# Patient Record
Sex: FEMALE | Race: WHITE | NOT HISPANIC OR LATINO | Employment: FULL TIME | URBAN - METROPOLITAN AREA
[De-identification: names, ages, dates, MRNs, and addresses within clinical notes are randomized per-mention and may not be internally consistent; named-entity substitution may affect disease eponyms.]

---

## 2017-01-17 ENCOUNTER — OFFICE VISIT (OUTPATIENT)
Dept: OBSTETRICS AND GYNECOLOGY | Facility: CLINIC | Age: 37
End: 2017-01-17

## 2017-01-17 VITALS
BODY MASS INDEX: 19.63 KG/M2 | WEIGHT: 115 LBS | SYSTOLIC BLOOD PRESSURE: 116 MMHG | DIASTOLIC BLOOD PRESSURE: 80 MMHG | HEIGHT: 64 IN

## 2017-01-17 DIAGNOSIS — Z00.00 ANNUAL PHYSICAL EXAM: Primary | ICD-10-CM

## 2017-01-17 PROCEDURE — 99395 PREV VISIT EST AGE 18-39: CPT | Performed by: OBSTETRICS & GYNECOLOGY

## 2017-01-17 NOTE — PROGRESS NOTES
"Subjective   Chief Complaint   Patient presents with   • Gynecologic Exam     missed last menses, neg upt     Francesca Castillo is a 36 y.o. year old  presenting to be seen for her annual exam.    Current birth control method: none.    Patient's last menstrual period was 2016.    She is sexually active.   Condoms are not typically used.      Past 6 month menstrual history:    Cycle Frequency: regular, predictable and consistent every 28 - 32 days   Menstrual cycle character: flow is normal   Cycle Duration: 5 - 7   Number of heavy days of flows: 2   Intermenstrual bleeding present: {no   Post-coital bleeding present: no     She exercises regularly: yes.  Calcium intake: no.  She performs self breast exam:yes.  She has concerns about domestic violence: no.    The following portions of the patient's history were reviewed and updated as appropriate:problem list, current medications, allergies, past family history, past medical history, past social history and past surgical history.    Review of Systems normal bladder and bowels     Objective   Visit Vitals   • /80   • Ht 64\" (162.6 cm)   • Wt 115 lb (52.2 kg)   • LMP 2016  Comment: 28 cycle days   • BMI 19.74 kg/m2       General:  well developed; well nourished  no acute distress   Skin:  No suspicious lesions seen   Thyroid: normal to inspection and palpation   Breasts:  Examined in supine position  Symmetric without masses or skin dimpling  Nipples normal without inversion, lesions or discharge  Bilateral implants are noted without obvious palpable abnormalities   Abdomen: soft, non-tender; no masses  no umbilical or inginual hernias are present  no hepato-splenomegaly   Pelvis: Clinical staff was present for exam  External genitalia:  normal appearance of the external genitalia including Bartholin's and Bend's glands.  :  urethral meatus normal; urethral hypermobility is absent.  Vaginal:  normal pink mucosa without prolapse or " lesions.  Cervix:  normal appearance. pap  Uterus:  normal size, shape and consistency. anteverted;  Adnexa:  normal bimanual exam of the adnexa.  Rectal:  digital rectal exam not performed; anus visually normal appearing.     Lab Review   No data reviewed    Imaging  No data reviewed       Assessment   1. A normal GYN exam she needs prenatal vitamin since she's not using anything for birth control.  They're currently not trying actively get pregnant but also not trying to prevent pregnancy.  She had a normal pregnancy test ×2 last week as she is late for her period.  Uterus feels very small on exam  2. I discussed that at her body habitus she needs 1000 mg of calcium daily.     Plan   1. Increase calcium and prenatal vitamins  2. If no period by February 1 we can check a beta hCG and/or TSH level    Medications Rx this encounter:  No orders of the defined types were placed in this encounter.         This note was electronically signed.    Micah Villagomez MD  1/17/2017

## 2017-01-17 NOTE — MR AVS SNAPSHOT
Francesca Castillo   2017 10:10 AM   Office Visit    Dept Phone:  [phone]   Encounter #:  [id number]    Provider:  Micah Villagomez MD   Department:  [facility]                Your Full Care Plan              Today's Medication Changes          These changes are accurate as of: 17 10:52 AM.  If you have any questions, ask your nurse or doctor.               Stop taking medication(s)listed here:     LOESTRIN 24 FE PO   Stopped by:  Micah Villagomez MD                      Your Updated Medication List          This list is accurate as of: 17 10:52 AM.  Always use your most recent med list.                BENADRYL ALLERGY PO               You Were Diagnosed With        Codes Comments    Annual physical exam    -  Primary ICD-10-CM: Z00.00  ICD-9-CM: V70.0       Instructions     None    Patient Instructions History      Upcoming Appointments     Visit Type Date Time Department    ANNUAL 2017 10:10 AM [facility] K[facility]      Mang?rKarthart Signup     [facility] [employer] allows you to send messages to your doctor, view your test results, renew your prescriptions, schedule appointments, and more. To sign up, go to [employer] and click on the Sign Up Now link in the New User? box. Enter your [employer] Activation Code exactly as it appears below along with the last four digits of your Social Security Number and your Date of Birth () to complete the sign-up process. If you do not sign up before the expiration date, you must request a new code.    [employer] Activation Code: [email or web address]  Expires: 2017 10:52 AM    If you have questions, you can email [email or web address] or call [phone] to talk to our [employer] staff. Remember, [employer] is NOT to be used for urgent needs. For medical emergencies, dial 911.               Other Info from Your Visit           Your Appointments     2018  9:00 AM EST   Annual with  "Micah Villagomez MD   [facility] [place] (--)    [address]  [place] [address] [phone]              Allergies     Penicillins        Reason for Visit     Gynecologic Exam missed last menses, neg upt      Vital Signs     Blood Pressure Height Weight Last Menstrual Period Body Mass Index Smoking Status    116/80 64\" (162.6 cm) 115 lb (52.2 kg) 12/05/2016 19.74 kg/m2 Never Smoker      Problems and Diagnoses Noted     Annual physical exam    -  Primary        "

## 2017-10-25 ENCOUNTER — LAB REQUISITION (OUTPATIENT)
Dept: LAB | Facility: HOSPITAL | Age: 37
End: 2017-10-25

## 2017-10-25 ENCOUNTER — TELEPHONE (OUTPATIENT)
Dept: OBSTETRICS AND GYNECOLOGY | Facility: CLINIC | Age: 37
End: 2017-10-25

## 2017-10-25 ENCOUNTER — APPOINTMENT (OUTPATIENT)
Dept: LAB | Facility: HOSPITAL | Age: 37
End: 2017-10-25

## 2017-10-25 DIAGNOSIS — N91.2 AMENORRHEA: Primary | ICD-10-CM

## 2017-10-25 DIAGNOSIS — Z00.00 ROUTINE GENERAL MEDICAL EXAMINATION AT A HEALTH CARE FACILITY: ICD-10-CM

## 2017-10-25 LAB — HCG INTACT+B SERPL-ACNC: 560 MIU/ML

## 2017-10-25 PROCEDURE — 84702 CHORIONIC GONADOTROPIN TEST: CPT | Performed by: OBSTETRICS & GYNECOLOGY

## 2017-11-02 LAB — HCG INTACT+B SERPL-ACNC: 560 MIU/ML

## 2017-11-16 ENCOUNTER — INITIAL PRENATAL (OUTPATIENT)
Dept: OBSTETRICS AND GYNECOLOGY | Facility: CLINIC | Age: 37
End: 2017-11-16

## 2017-11-16 VITALS — DIASTOLIC BLOOD PRESSURE: 60 MMHG | SYSTOLIC BLOOD PRESSURE: 108 MMHG | WEIGHT: 118 LBS | BODY MASS INDEX: 20.25 KG/M2

## 2017-11-16 DIAGNOSIS — Z3A.01 LESS THAN 8 WEEKS GESTATION OF PREGNANCY: Primary | ICD-10-CM

## 2017-11-16 PROBLEM — Z98.891 STATUS POST REPEAT LOW TRANSVERSE CESAREAN SECTION: Status: ACTIVE | Noted: 2017-11-16

## 2017-11-16 PROCEDURE — 0501F PRENATAL FLOW SHEET: CPT | Performed by: OBSTETRICS & GYNECOLOGY

## 2017-11-19 PROBLEM — Z98.82 H/O BREAST AUGMENTATION: Status: ACTIVE | Noted: 2017-11-19

## 2017-11-19 PROBLEM — O34.219 VAGINAL BIRTH AFTER CESAREAN: Status: ACTIVE | Noted: 2017-11-19

## 2017-11-19 NOTE — PROGRESS NOTES
Subjective   Chief Complaint   Patient presents with   • Initial Prenatal Visit       Francesca Castillo is a 37 y.o. year old .  Patient's last menstrual period was 2017 (exact date).  She presents to be seen to initiate prenatal care. Patient had initial   for breech presentation at 38 weeks.  Also low fluid.   she had twins delivered at 33 weeks.  Twin A Simon was vaginal Huynh converted from breech to transverse and was delivered by .  Otherwise she is very healthy some nausea this pregnancy.  Discussed Diclegis small frequent meals and acids etc.    Smoking status: Never Smoker                                                              Smokeless status: Never Used                          The following portions of the patient's history were reviewed and updated as appropriate:vital signs, allergies, current medications, past medical history, past social history, past surgical history and problem list.    Objective   Lab Review   No data reviewed    Imaging   Pelvic ultrasound report    Assessment/Plan   ASSESSMENT  1. 37 y.o. year old  at 7 weeks 3 days dates and ultrasound  2. Supervision of high risk pregnancy  3. Previous C/S with planned repeat C/S    PLAN  1. The problem list for pregnancy was initiated today  2. Tests ordered today:  3. Genetic testing reviewed: NIPT ([place])  4. Information reviewed: exercise in pregnancy, nutrition in pregnancy, weight gain in pregnancy, work and travel restrictions during pregnancy, list of OTC medications acceptable in pregnancy and call coverage groups    Follow up: 3 week(s)       This note was electronically signed.    Micah Villagomez MD  2017

## 2017-12-08 ENCOUNTER — ROUTINE PRENATAL (OUTPATIENT)
Dept: OBSTETRICS AND GYNECOLOGY | Facility: CLINIC | Age: 37
End: 2017-12-08

## 2017-12-08 ENCOUNTER — LAB REQUISITION (OUTPATIENT)
Dept: LAB | Facility: HOSPITAL | Age: 37
End: 2017-12-08

## 2017-12-08 VITALS — DIASTOLIC BLOOD PRESSURE: 60 MMHG | WEIGHT: 118 LBS | SYSTOLIC BLOOD PRESSURE: 106 MMHG | BODY MASS INDEX: 20.25 KG/M2

## 2017-12-08 DIAGNOSIS — O09.521 SUPERVISION OF ELDERLY MULTIGRAVIDA IN FIRST TRIMESTER: ICD-10-CM

## 2017-12-08 DIAGNOSIS — Z00.00 ROUTINE GENERAL MEDICAL EXAMINATION AT A HEALTH CARE FACILITY: ICD-10-CM

## 2017-12-08 DIAGNOSIS — O09.521 AMA (ADVANCED MATERNAL AGE) MULTIGRAVIDA 35+, FIRST TRIMESTER: ICD-10-CM

## 2017-12-08 DIAGNOSIS — Z3A.10 10 WEEKS GESTATION OF PREGNANCY: Primary | ICD-10-CM

## 2017-12-08 DIAGNOSIS — Z3A.10 10 WEEKS GESTATION OF PREGNANCY: ICD-10-CM

## 2017-12-08 LAB — EXTERNAL NIPT: NORMAL

## 2017-12-08 PROCEDURE — 36415 COLL VENOUS BLD VENIPUNCTURE: CPT | Performed by: OBSTETRICS & GYNECOLOGY

## 2017-12-08 PROCEDURE — 36415 COLL VENOUS BLD VENIPUNCTURE: CPT

## 2017-12-08 PROCEDURE — 0502F SUBSEQUENT PRENATAL CARE: CPT | Performed by: OBSTETRICS & GYNECOLOGY

## 2017-12-08 RX ORDER — CALCIUM CITRATE, IRON PENTACARBONYL, CHOLECALCIFEROL, .ALPHA.-TOCOPHEROL, DL-, PYRIDOXINE HYDROCHLORIDE, FOLIC ACID, DOCUSATE SODIUM, AND DOCONEXENT 104; 27; 400; 30; 25; 1; 50; 260 MG/1; MG/1; [IU]/1; [IU]/1; MG/1; MG/1; MG/1; MG/1
1 CAPSULE, GELATIN COATED ORAL DAILY
Qty: 30 CAPSULE | Refills: 11 | Status: SHIPPED | OUTPATIENT
Start: 2017-12-08 | End: 2018-02-05 | Stop reason: ALTCHOICE

## 2017-12-08 NOTE — PROGRESS NOTES
Chief Complaint   Patient presents with   • Routine Prenatal Visit       HPI: Francesca is a  currently at 10w4d who today reports the following:  Nausea - improved as compared to the prior visit; Vaginal bleeding -  No; Heartburn - No.    ROS:  GI: Constipation - improved as compared to the prior visit; Diarrhea - No    Neuro: Headache - No; Visual change - No      EXAM:  Vitals: See prenatal flowsheet   Abdomen: See prenatal flowsheet   Urine glucose/protein: See prenatal flowsheet   Pelvic: See prenatal flowsheet     Prenatal Labs  No results found for: HGB, RUBELLAABIGG, HEPBSAG, LABRPR, ABORH, ABSCRN, LABANTI, ABID, XRBZEAU90, DII2ALW8, HEPCVIRUSABY, ETPXLBZ7ST, GCT, GGTFASTING, ZGF3HESN, OEE1RVDY, TFU2WXSK, STREPGPB, URINECX, CHLAMNAA, NGONORRHON    MDM:  Impression: 1. Supervision of high risk pregnancy  2. AMA   Tests done today: 1. labs and NIPT   Topics discussed: 1. Continue with PNV's  2. Prenatal labs reviewed  3. none - she had no major complaints,questions or concerns   Tests scheduled today for her next visit:   none   Next visit: See prenatal flowsheet

## 2017-12-09 LAB
ABO GROUP BLD: (no result)
APPEARANCE UR: CLEAR
BACTERIA #/AREA URNS HPF: NORMAL /[HPF]
BASOPHILS # BLD AUTO: 0 X10E3/UL (ref 0–0.2)
BASOPHILS NFR BLD AUTO: 0 %
BILIRUB UR QL STRIP: NEGATIVE
BLD GP AB SCN SERPL QL: NEGATIVE
COLOR UR: YELLOW
EOSINOPHIL # BLD AUTO: 0 X10E3/UL (ref 0–0.4)
EOSINOPHIL NFR BLD AUTO: 1 %
EPI CELLS #/AREA URNS HPF: NORMAL /HPF
ERYTHROCYTE [DISTWIDTH] IN BLOOD BY AUTOMATED COUNT: 13.5 % (ref 12.3–15.4)
GLUCOSE UR QL: NEGATIVE
HBV SURFACE AG SERPL QL IA: NEGATIVE
HCT VFR BLD AUTO: 42.3 % (ref 34–46.6)
HGB BLD-MCNC: 14.3 G/DL (ref 11.1–15.9)
HGB UR QL STRIP: NEGATIVE
HIV 1+2 AB+HIV1 P24 AG SERPL QL IA: NON REACTIVE
IMM GRANULOCYTES # BLD: 0 X10E3/UL (ref 0–0.1)
IMM GRANULOCYTES NFR BLD: 0 %
KETONES UR QL STRIP: NEGATIVE
LEUKOCYTE ESTERASE UR QL STRIP: NEGATIVE
LYMPHOCYTES # BLD AUTO: 1.3 X10E3/UL (ref 0.7–3.1)
LYMPHOCYTES NFR BLD AUTO: 16 %
MCH RBC QN AUTO: 30 PG (ref 26.6–33)
MCHC RBC AUTO-ENTMCNC: 33.8 G/DL (ref 31.5–35.7)
MCV RBC AUTO: 89 FL (ref 79–97)
MICRO URNS: NORMAL
MICRO URNS: NORMAL
MONOCYTES # BLD AUTO: 0.8 X10E3/UL (ref 0.1–0.9)
MONOCYTES NFR BLD AUTO: 9 %
MUCOUS THREADS URNS QL MICRO: PRESENT
NEUTROPHILS # BLD AUTO: 6.1 X10E3/UL (ref 1.4–7)
NEUTROPHILS NFR BLD AUTO: 74 %
NITRITE UR QL STRIP: NEGATIVE
PH UR STRIP: 7.5 [PH] (ref 5–7.5)
PLATELET # BLD AUTO: 222 X10E3/UL (ref 150–379)
PROT UR QL STRIP: NEGATIVE
RBC # BLD AUTO: 4.76 X10E6/UL (ref 3.77–5.28)
RBC #/AREA URNS HPF: NORMAL /HPF
RH BLD: POSITIVE
RPR SER QL: NON REACTIVE
RUBV IGG SERPL IA-ACNC: 2.64 INDEX
SP GR UR: 1.01 (ref 1–1.03)
URINALYSIS REFLEX: NORMAL
UROBILINOGEN UR STRIP-MCNC: 0.2 MG/DL (ref 0.2–1)
WBC # BLD AUTO: 8.2 X10E3/UL (ref 3.4–10.8)
WBC #/AREA URNS HPF: NORMAL /HPF

## 2018-01-10 ENCOUNTER — LAB (OUTPATIENT)
Dept: LAB | Facility: HOSPITAL | Age: 38
End: 2018-01-10

## 2018-01-10 ENCOUNTER — ROUTINE PRENATAL (OUTPATIENT)
Dept: OBSTETRICS AND GYNECOLOGY | Facility: CLINIC | Age: 38
End: 2018-01-10

## 2018-01-10 VITALS — WEIGHT: 120 LBS | SYSTOLIC BLOOD PRESSURE: 106 MMHG | BODY MASS INDEX: 20.6 KG/M2 | DIASTOLIC BLOOD PRESSURE: 60 MMHG

## 2018-01-10 DIAGNOSIS — Z3A.15 15 WEEKS GESTATION OF PREGNANCY: Primary | ICD-10-CM

## 2018-01-10 DIAGNOSIS — O09.521 AMA (ADVANCED MATERNAL AGE) MULTIGRAVIDA 35+, FIRST TRIMESTER: Primary | ICD-10-CM

## 2018-01-10 DIAGNOSIS — Z3A.15 15 WEEKS GESTATION OF PREGNANCY: ICD-10-CM

## 2018-01-10 LAB — AFP INTERP SERPL-IMP: NEGATIVE

## 2018-01-10 PROCEDURE — 36415 COLL VENOUS BLD VENIPUNCTURE: CPT

## 2018-01-10 PROCEDURE — 0502F SUBSEQUENT PRENATAL CARE: CPT | Performed by: OBSTETRICS & GYNECOLOGY

## 2018-01-10 NOTE — PROGRESS NOTES
Chief Complaint   Patient presents with   • Routine Prenatal Visit     occas cramping       HPI: Francesca is a  currently at 15w2d who today reports the following:  Contractions - No; Leaking - clear discharge no odor or itch; Vaginal bleeding -  No; Heartburn - No.    ROS:  GI: Nausea - No ; Constipation - improved as compared to the prior visit; Diarrhea - No    Neuro: Headache - 2 weeks / prone to headaches anyway 1 tylenol prn ; Visual change - No      EXAM  Vitals: See prenatal flowsheet   Abdomen: See prenatal flowsheet   Urine glucose/protein: See prenatal flowsheet   Pelvic: See prenatal flowsheet     Lab Results   Component Value Date    ABO O 2017    RH Positive 2017    ABSCRN Negative 2017       MDM:  Impression: 1. Supervision of high risk pregnancy  2. Previous C/S with route of delivery to be determined  3. AMA   4.  of 3# first twin at 33 weeks   Tests done today: 1. AFP   Topics discussed: 1. Continue with PNV's  2. Prenatal labs reviewed  3. NIPT bill or EOB $3000   4. Travel in May at ~34 weeks [place] [address] also travel to [facility] mid February for 11th birthday party  5. Salpingectomy info-  has vasectomy consult also    Tests scheduled today for her next visit:   U/S for anatomic screening   Next visit: See prenatal flowsheet

## 2018-01-12 LAB — REF LAB TEST METHOD: NORMAL

## 2018-02-05 ENCOUNTER — ROUTINE PRENATAL (OUTPATIENT)
Dept: OBSTETRICS AND GYNECOLOGY | Facility: CLINIC | Age: 38
End: 2018-02-05

## 2018-02-05 ENCOUNTER — OFFICE VISIT (OUTPATIENT)
Dept: OBSTETRICS AND GYNECOLOGY | Facility: HOSPITAL | Age: 38
End: 2018-02-05

## 2018-02-05 ENCOUNTER — HOSPITAL ENCOUNTER (OUTPATIENT)
Dept: WOMENS IMAGING | Facility: HOSPITAL | Age: 38
Discharge: HOME | End: 2018-02-05
Attending: OBSTETRICS & GYNECOLOGY | Admitting: OBSTETRICS & GYNECOLOGY

## 2018-02-05 VITALS — WEIGHT: 121 LBS | SYSTOLIC BLOOD PRESSURE: 110 MMHG | BODY MASS INDEX: 21.27 KG/M2 | DIASTOLIC BLOOD PRESSURE: 60 MMHG

## 2018-02-05 VITALS
WEIGHT: 118 LBS | DIASTOLIC BLOOD PRESSURE: 82 MMHG | SYSTOLIC BLOOD PRESSURE: 109 MMHG | HEIGHT: 63 IN | BODY MASS INDEX: 20.91 KG/M2

## 2018-02-05 DIAGNOSIS — Z3A.19 19 WEEKS GESTATION OF PREGNANCY: Primary | ICD-10-CM

## 2018-02-05 DIAGNOSIS — O09.521 AMA (ADVANCED MATERNAL AGE) MULTIGRAVIDA 35+, FIRST TRIMESTER: ICD-10-CM

## 2018-02-05 DIAGNOSIS — O09.521 AMA (ADVANCED MATERNAL AGE) MULTIGRAVIDA 35+, FIRST TRIMESTER: Primary | ICD-10-CM

## 2018-02-05 DIAGNOSIS — Z98.891 PREVIOUS CESAREAN SECTION: ICD-10-CM

## 2018-02-05 PROBLEM — Z20.828 EXPOSURE TO INFLUENZA: Status: ACTIVE | Noted: 2018-02-05

## 2018-02-05 PROCEDURE — 76811 OB US DETAILED SNGL FETUS: CPT | Performed by: OBSTETRICS & GYNECOLOGY

## 2018-02-05 PROCEDURE — 76811 OB US DETAILED SNGL FETUS: CPT

## 2018-02-05 PROCEDURE — 0502F SUBSEQUENT PRENATAL CARE: CPT | Performed by: OBSTETRICS & GYNECOLOGY

## 2018-02-05 RX ORDER — PRENATAL VIT/IRON FUM/FOLIC AC 27MG-0.8MG
TABLET ORAL
Refills: 11 | COMMUNITY
Start: 2018-01-18 | End: 2018-03-19

## 2018-02-05 RX ORDER — OSELTAMIVIR PHOSPHATE 75 MG/1
75 CAPSULE ORAL DAILY
Qty: 10 CAPSULE | Refills: 0 | Status: SHIPPED | OUTPATIENT
Start: 2018-02-05 | End: 2018-03-19

## 2018-02-05 NOTE — PROGRESS NOTES
Reports had [facility] NIPT and [facility]SAFP, both with normal results. Denies problems. To see Dr. Villagomez today.

## 2018-02-05 NOTE — PROGRESS NOTES
Chief Complaint   Patient presents with   • Routine Prenatal Visit     ? exposure to flu? /  trouble sleeping       HPI: Francesca is a  currently at 19w0d who today reports the following: not sleeping well - sick child doesn't help no OTC except Benadryl at night add Melatonin  Contractions - YES - but less than 4/hour AND no associated change in vaginal discharge; Leaking - No; Vaginal bleeding -  No; Heartburn - No.    ROS:  GI: Nausea - improved as compared to the prior visit ; Constipation - YES; Diarrhea - No    Neuro: Headache - No ; Visual change - No      EXAM:  Vitals: See prenatal flowsheet   Abdomen: See prenatal flowsheet   Urine glucose/protein: See prenatal flowsheet   Pelvic: See prenatal flowsheet     Lab Results   Component Value Date    ABO O 2017    RH Positive 2017    ABSCRN Negative 2017       MDM:  Impression: 1. Supervision of low risk pregnancy  2. exposure to flu ? child is sick - Rx tamiflu   Tests done today: 1. none   Topics discussed: 1. Continue with PNV's  2. Prenatal labs reviewed  3. see above   Tests scheduled today for her next visit:   GCT  HgB   Next visit: See prenatal flowsheet

## 2018-03-07 ENCOUNTER — LAB REQUISITION (OUTPATIENT)
Dept: LAB | Facility: HOSPITAL | Age: 38
End: 2018-03-07

## 2018-03-07 ENCOUNTER — ROUTINE PRENATAL (OUTPATIENT)
Dept: OBSTETRICS AND GYNECOLOGY | Facility: CLINIC | Age: 38
End: 2018-03-07

## 2018-03-07 VITALS — DIASTOLIC BLOOD PRESSURE: 70 MMHG | SYSTOLIC BLOOD PRESSURE: 114 MMHG | BODY MASS INDEX: 22.5 KG/M2 | WEIGHT: 128 LBS

## 2018-03-07 DIAGNOSIS — O09.521 AMA (ADVANCED MATERNAL AGE) MULTIGRAVIDA 35+, FIRST TRIMESTER: ICD-10-CM

## 2018-03-07 DIAGNOSIS — Z3A.23 23 WEEKS GESTATION OF PREGNANCY: Primary | ICD-10-CM

## 2018-03-07 DIAGNOSIS — Z00.00 ROUTINE GENERAL MEDICAL EXAMINATION AT A HEALTH CARE FACILITY: ICD-10-CM

## 2018-03-07 LAB — GLUCOSE BLDC GLUCOMTR-MCNC: 139 MG/DL

## 2018-03-07 PROCEDURE — 0502F SUBSEQUENT PRENATAL CARE: CPT | Performed by: OBSTETRICS & GYNECOLOGY

## 2018-03-07 PROCEDURE — 82962 GLUCOSE BLOOD TEST: CPT | Performed by: OBSTETRICS & GYNECOLOGY

## 2018-03-07 PROCEDURE — 36415 COLL VENOUS BLD VENIPUNCTURE: CPT | Performed by: OBSTETRICS & GYNECOLOGY

## 2018-03-07 NOTE — PROGRESS NOTES
Chief Complaint   Patient presents with   • Routine Prenatal Visit       HPI: Francesca is a  currently at 23w2d who today reports the following: frequency   Contractions - YES - but less than 4/hour AND no associated change in vaginal discharge; Leaking - No; Vaginal bleeding -  No; Heartburn - YES.tums not helping try Zantac etc avoid lying down for 2 hours after eating    ROS:  GI: Nausea - No ; Constipation - No; Diarrhea - improved as compared to the prior visit    Neuro: Headache - YES  Sinus related probably; Flonase etc okay; Visual change - No      EXAM:  Vitals: See prenatal flowsheet   Abdomen: See prenatal flowsheet   Urine glucose/protein: See prenatal flowsheet   Pelvic: See prenatal flowsheet     Lab Results   Component Value Date    ABO O 2017    RH Positive 2017    ABSCRN Negative 2017       MDM:  Impression: 1. Supervision of high risk pregnancy  2. Previous C/S with route of delivery to be determined  3. AMA   Tests done today: 1. GCT  2. HgB   Topics discussed: 1. Continue with PNV's  2. Prenatal labs reviewed  3. frequency and sinus headaches   Tests scheduled today for her next visit:   none   Next visit: See prenatal flowsheet

## 2018-03-09 PROBLEM — R82.71 GBS BACTERIURIA: Status: ACTIVE | Noted: 2018-03-09

## 2018-03-09 LAB
APPEARANCE UR: CLEAR
BACTERIA #/AREA URNS HPF: NORMAL /HPF
BACTERIA UR CULT: ABNORMAL
BACTERIA UR CULT: ABNORMAL
BILIRUB UR QL STRIP: NEGATIVE
COLOR UR: YELLOW
EPI CELLS #/AREA URNS HPF: NORMAL /HPF
GLUCOSE 1H P 50 G GLC PO SERPL-MCNC: 132 MG/DL (ref 65–139)
GLUCOSE UR QL: NEGATIVE
HGB BLD-MCNC: 11.6 G/DL (ref 11.5–15.5)
HGB UR QL STRIP: NEGATIVE
KETONES UR QL STRIP: NEGATIVE
LEUKOCYTE ESTERASE UR QL STRIP: ABNORMAL
MICRO URNS: ABNORMAL
MUCOUS THREADS URNS QL MICRO: PRESENT /HPF
NITRITE UR QL STRIP: NEGATIVE
PH UR STRIP: 6.5 [PH] (ref 5–7.5)
PROT UR QL STRIP: NEGATIVE
RBC #/AREA URNS HPF: NORMAL /HPF
SP GR UR: 1.01 (ref 1–1.03)
URINALYSIS REFLEX: ABNORMAL
UROBILINOGEN UR STRIP-MCNC: 0.2 MG/DL (ref 0.2–1)
WBC #/AREA URNS HPF: NORMAL /HPF

## 2018-03-19 ENCOUNTER — ROUTINE PRENATAL (OUTPATIENT)
Dept: OBSTETRICS AND GYNECOLOGY | Facility: CLINIC | Age: 38
End: 2018-03-19

## 2018-03-19 VITALS — WEIGHT: 130 LBS | SYSTOLIC BLOOD PRESSURE: 118 MMHG | BODY MASS INDEX: 22.85 KG/M2 | DIASTOLIC BLOOD PRESSURE: 70 MMHG

## 2018-03-19 DIAGNOSIS — Z98.891 STATUS POST REPEAT LOW TRANSVERSE CESAREAN SECTION: ICD-10-CM

## 2018-03-19 DIAGNOSIS — O09.521 AMA (ADVANCED MATERNAL AGE) MULTIGRAVIDA 35+, FIRST TRIMESTER: ICD-10-CM

## 2018-03-19 DIAGNOSIS — Z3A.25 25 WEEKS GESTATION OF PREGNANCY: Primary | ICD-10-CM

## 2018-03-19 LAB
BILIRUB BLD-MCNC: NEGATIVE MG/DL
CLARITY, POC: ABNORMAL
COLOR UR: YELLOW
GLUCOSE UR STRIP-MCNC: NEGATIVE MG/DL
KETONES UR QL: NEGATIVE
LEUKOCYTE EST, POC: ABNORMAL
NITRITE UR-MCNC: NEGATIVE MG/ML
PH UR: 9 [PH] (ref 5–8)
PROT UR STRIP-MCNC: NEGATIVE MG/DL
RBC # UR STRIP: NEGATIVE /UL
SP GR UR: 1.01 (ref 1–1.03)
UROBILINOGEN UR QL: NORMAL

## 2018-03-19 PROCEDURE — 0502F SUBSEQUENT PRENATAL CARE: CPT | Performed by: OBSTETRICS & GYNECOLOGY

## 2018-03-19 PROCEDURE — 81003 URINALYSIS AUTO W/O SCOPE: CPT | Performed by: OBSTETRICS & GYNECOLOGY

## 2018-03-19 PROCEDURE — 87086 URINE CULTURE/COLONY COUNT: CPT | Performed by: OBSTETRICS & GYNECOLOGY
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
## 2018-03-21 LAB
BACTERIA SPEC AEROBE CULT: NORMAL
BACTERIA SPEC AEROBE CULT: NORMAL

## 2018-04-04 ENCOUNTER — ROUTINE PRENATAL (OUTPATIENT)
Dept: OBSTETRICS AND GYNECOLOGY | Facility: CLINIC | Age: 38
End: 2018-04-04

## 2018-04-04 VITALS — BODY MASS INDEX: 23.37 KG/M2 | SYSTOLIC BLOOD PRESSURE: 116 MMHG | DIASTOLIC BLOOD PRESSURE: 70 MMHG | WEIGHT: 133 LBS

## 2018-04-04 DIAGNOSIS — Z3A.25 25 WEEKS GESTATION OF PREGNANCY: Primary | ICD-10-CM

## 2018-04-04 PROCEDURE — 0502F SUBSEQUENT PRENATAL CARE: CPT | Performed by: OBSTETRICS & GYNECOLOGY

## 2018-04-04 NOTE — PROGRESS NOTES
Chief Complaint   Patient presents with   • Routine Prenatal Visit   • Contractions       HPI: Francesca is a  currently at 27w2d who today reports the following:  Contractions - YES - but less than 4/hour AND no associated change in vaginal discharge; Leaking - No; Vaginal bleeding -  No; Heartburn - No.    ROS:  GI: Nausea - No ; Constipation - No; Diarrhea - No    Neuro: Headache - improved as compared to the prior visit ; Visual change - No      EXAM:  Vitals: See prenatal flowsheet   Abdomen: See prenatal flowsheet   Urine glucose/protein: See prenatal flowsheet   Pelvic: See prenatal flowsheet     Lab Results   Component Value Date    ABO O 2017    RH Positive 2017    ABSCRN Negative 2017       MDM:  Impression: 1. Supervision of high risk pregnancy  2. Previous C/S with planned repeat C/S   Tests done today: 1. none   Topics discussed: 1. Continue with PNV's  2. Prenatal labs reviewed  3.  labor signs and symptoms   Tests scheduled today for her next visit:   none Tdap vaccination   Next visit: See prenatal flowsheet

## 2018-04-08 ENCOUNTER — HOSPITAL ENCOUNTER (OUTPATIENT)
Facility: HOSPITAL | Age: 38
Discharge: HOME | End: 2018-04-08
Attending: OBSTETRICS & GYNECOLOGY | Admitting: OBSTETRICS & GYNECOLOGY

## 2018-04-08 VITALS
TEMPERATURE: 98.6 F | HEART RATE: 93 BPM | SYSTOLIC BLOOD PRESSURE: 105 MMHG | RESPIRATION RATE: 16 BRPM | DIASTOLIC BLOOD PRESSURE: 75 MMHG

## 2018-04-08 LAB
BACTERIA UR QL AUTO: ABNORMAL /HPF
BILIRUB UR QL STRIP: NEGATIVE
CLARITY UR: CLEAR
COLOR UR: YELLOW
GLUCOSE UR STRIP-MCNC: NEGATIVE MG/DL
HGB UR QL STRIP.AUTO: NEGATIVE
HYALINE CASTS UR QL AUTO: ABNORMAL /LPF
KETONES UR QL STRIP: ABNORMAL
LEUKOCYTE ESTERASE UR QL STRIP.AUTO: ABNORMAL
NITRITE UR QL STRIP: NEGATIVE
PH UR STRIP.AUTO: 6.5 [PH] (ref 5–8)
PROT UR QL STRIP: NEGATIVE
RBC # UR: ABNORMAL /HPF
REF LAB TEST METHOD: ABNORMAL
SP GR UR STRIP: 1.01 (ref 1–1.03)
SQUAMOUS #/AREA URNS HPF: ABNORMAL /HPF
UROBILINOGEN UR QL STRIP: ABNORMAL
WBC UR QL AUTO: ABNORMAL /HPF

## 2018-04-08 PROCEDURE — G0463 HOSPITAL OUTPT CLINIC VISIT: HCPCS

## 2018-04-08 PROCEDURE — 59025 FETAL NON-STRESS TEST: CPT

## 2018-04-08 PROCEDURE — 99214 OFFICE O/P EST MOD 30 MIN: CPT | Performed by: OBSTETRICS & GYNECOLOGY

## 2018-04-08 PROCEDURE — 81001 URINALYSIS AUTO W/SCOPE: CPT | Performed by: OBSTETRICS & GYNECOLOGY

## 2018-04-09 NOTE — H&P
Nereida  Obstetric History and Physical    Chief Complaint   Patient presents with   • Contractions       Subjective     Patient is a 37 y.o. female  currently at 27w6d, who presents with some mild contractions that started about 18:00.  She says this has been happening the last few days about the same time and usually they went away after a couple of hours.  Tonight they did not stop as soon as she was expecting.  They did stop once she arrived here.  She says they were never bad, but she just was not sure if it was normal.  She denies leaking or bleeding.  +FM.        The following portions of the patients history were reviewed and updated as appropriate: current medications, allergies, past medical history, past surgical history, past family history, past social history and problem list .       Prenatal Information:   Prenatal Labs  Lab Results   Component Value Date    HEPBSAG Negative 2017    ABO O 2017    RH Positive 2017    ABSCRN Negative 2017         External Prenatal Results         Pregnancy Outside Results - these were transcribed from office records.  See scanned records for details. Date Time   Hgb  11.6 g/dL 18 0938   Hct  42.3 % 17 1455   ABO  O  17 1455   Rh  Positive  17 1455   Antibody Screen  Negative  17 1455   Glucose Fasting GTT      Glucose Tolerance Test 1 hour      Glucose Tolerance Test 3 hour      Gonorrhea (discrete)      Chlamydia (discrete)      RPR  Non Reactive  17 1455   VDRL      Syphillis Antibody      Rubella  2.64 index 17 1455   HBsAg  Negative  17 1455   Herpes Simplex Virus PCR      Herpes Simplex VIrus Culture      HIV  Non Reactive  17 1455   Hep C RNA Quant PCR      Hep C Antibody      AFP      Group B Strep      GBS Susceptibility to Clindamycin      GBS Susceptibility to Eythromycin      Fetal Fibronectin      Genetic Testing, Maternal Blood      Drug Screening Date Time   Urine  Drug Screen      Amphetamine Screen      Barbiturate Screen      Benzodiazepine Screen      Methadone Screen      Phencyclidine Screen      Opiates Screen      THC Screen      Cocaine Screen      Propoxyphene Screen      Buprenorphine Screen      Methamphetamine Screen      Oxycodone Screen      Tryicyclic Antidepressants Screen                Legend: ^: Historical                       Past OB History:       Obstetric History       T1      L3     SAB0   TAB0   Ectopic0   Molar0   Multiple1   Live Births3       # Outcome Date GA Lbr Tobin/2nd Weight Sex Delivery Anes PTL Lv   3 Current            2A  09/16/10 33w4d  1701 g (3 lb 12 oz) M Vag-Spont  Y NARA      Name: Montes      Complications: Premature labor   2B  09/16/10 33w4d  1389 g (3 lb 1 oz) F CS-LTranv  Y NARA      Name: Lino      Complications: Premature labor   1 Term 02/15/08 39w0d  3147 g (6 lb 15 oz) F CS-LTranv  N NARA      Name: Meg      Complications: Breech presentation          Past Medical History: Past Medical History:   Diagnosis Date   • Asthma     as a child   • Frequent headaches    • History of abnormal cervical Pap smear     colpo with bx wnl      Past Surgical History Past Surgical History:   Procedure Laterality Date   • BREAST SURGERY      Implants   •  SECTION     •  SECTION     • WISDOM TOOTH EXTRACTION        Family History: Family History   Problem Relation Age of Onset   • Heart disease Paternal Grandfather       Social History:  reports that she quit smoking about 14 years ago. Her smoking use included Cigarettes. She has never used smokeless tobacco.   reports that she does not drink alcohol.   reports that she does not use drugs.        General ROS: Denies fever, HA, shortness of air, muscle weakness, and rashes    Objective       Vital Signs Range for the last 24 hours  Temperature: Temp:  [98.6 °F (37 °C)] 98.6 °F (37 °C)   Temp Source: Temp src: Oral   BP: BP: (105)/(75)  105/75   Pulse: Heart Rate:  [93] 93   Respirations: Resp:  [16] 16   SPO2:     O2 Amount (l/min):     O2 Devices     Weight:       Physical Examination: Alert and oriented X 3  Chest - clear to auscultation, no wheezes, rales or rhonchi, symmetric air entry  Heart - normal rate, S1, S2, no murmurs  Abdomen - no rebound tenderness noted  bowel sounds normal  Gravid uterus, No RUQ pain, No guarding  Extremities - Non-tender bilaterally        Cervix: Exam by:    Not checked per request since she was not juana   Dilation:     Effacement:     Station:         Fetal Heart Rate Assessment   Method:     Beats/min:     Baseline:  130s   Varibility:  mod   Accels:  y   Decels:  n   Tracing Category:       Uterine Assessment   Method:     Frequency (min):  none   Ctx Count in 10 min:     Duration:     Intensity:     Intensity by IUPC:     Resting Tone:     Resting Tone by IUPC:     Ankur Units:             Assessment:  1.  Intrauterine pregnancy at 27w6d weeks gestation with reassuring fetal status.    2.  False labour not ruptured   contractions    Plan:  1. FHTs  Reactive NST  2. No cervical change or signs and symptoms of SROM or labour  3. Reviewed labour guidelines  4. All questions have been answered.  5. Discharge home with routine OB follow-up      Radames Gill MD  2018  10:17 PM

## 2018-04-25 ENCOUNTER — ROUTINE PRENATAL (OUTPATIENT)
Dept: OBSTETRICS AND GYNECOLOGY | Facility: CLINIC | Age: 38
End: 2018-04-25

## 2018-04-25 VITALS — SYSTOLIC BLOOD PRESSURE: 112 MMHG | WEIGHT: 132 LBS | DIASTOLIC BLOOD PRESSURE: 60 MMHG | BODY MASS INDEX: 23.2 KG/M2

## 2018-04-25 DIAGNOSIS — R82.71 GBS BACTERIURIA: ICD-10-CM

## 2018-04-25 DIAGNOSIS — O09.521 AMA (ADVANCED MATERNAL AGE) MULTIGRAVIDA 35+, FIRST TRIMESTER: ICD-10-CM

## 2018-04-25 DIAGNOSIS — Z98.891 STATUS POST REPEAT LOW TRANSVERSE CESAREAN SECTION: ICD-10-CM

## 2018-04-25 DIAGNOSIS — Z3A.30 30 WEEKS GESTATION OF PREGNANCY: Primary | ICD-10-CM

## 2018-04-25 PROCEDURE — 90715 TDAP VACCINE 7 YRS/> IM: CPT | Performed by: OBSTETRICS & GYNECOLOGY

## 2018-04-25 PROCEDURE — 90471 IMMUNIZATION ADMIN: CPT | Performed by: OBSTETRICS & GYNECOLOGY

## 2018-04-25 PROCEDURE — 0502F SUBSEQUENT PRENATAL CARE: CPT | Performed by: OBSTETRICS & GYNECOLOGY

## 2018-04-25 NOTE — PROGRESS NOTES
Chief Complaint   Patient presents with   • Routine Prenatal Visit     irreg ctx's and cramping       HPI: Francesca is a  currently at 30w2d who today reports the following:  Contractions - YES - but less than 4/hour AND no associated change in vaginal discharge;She was seen at L&D for false labor prior 3 days ; Leaking - No; Vaginal bleeding -  No; Heartburn - YES. Horrible Tums no help Pepcid helps a lot    ROS:  GI: Nausea - No ; Constipation - improved as compared to the prior visit; Diarrhea - No    Neuro: Headache - improved as compared to the prior visit ; Visual change - No      EXAM:  Vitals: See prenatal flowsheet   Abdomen: See prenatal flowsheet   Urine glucose/protein: See prenatal flowsheet   Pelvic: See prenatal flowsheet     Lab Results   Component Value Date    HGB 11.6 2018    HCT 42.3 2017     2018    ABO O 2017    RH Positive 2017    ABSCRN Negative 2017       MDM:  Impression: 1. Supervision of high risk pregnancy  2. Previous C/S with planned repeat C/S  3. AMA  4. Constipation in pregnancy  5. False labor  6. GERD in pregnancy   Tests done today: 1. tdap    Topics discussed: 1. Continue with PNV's  2. Prenatal labs reviewed  3. no shaving in advanced of scheduled   4.  labor signs and symptoms  5. TDAP vaccination   6. Scheduling  section plus /minus tubal ligation    Tests scheduled today for her next visit:   none   Next visit: See prenatal flowsheet

## 2018-05-09 ENCOUNTER — ROUTINE PRENATAL (OUTPATIENT)
Dept: OBSTETRICS AND GYNECOLOGY | Facility: CLINIC | Age: 38
End: 2018-05-09

## 2018-05-09 ENCOUNTER — HOSPITAL ENCOUNTER (OUTPATIENT)
Dept: WOMENS IMAGING | Facility: HOSPITAL | Age: 38
Discharge: HOME | End: 2018-05-09
Attending: OBSTETRICS & GYNECOLOGY | Admitting: OBSTETRICS & GYNECOLOGY

## 2018-05-09 ENCOUNTER — OFFICE VISIT (OUTPATIENT)
Dept: OBSTETRICS AND GYNECOLOGY | Facility: HOSPITAL | Age: 38
End: 2018-05-09

## 2018-05-09 VITALS — WEIGHT: 132 LBS | SYSTOLIC BLOOD PRESSURE: 114 MMHG | DIASTOLIC BLOOD PRESSURE: 60 MMHG | BODY MASS INDEX: 23.2 KG/M2

## 2018-05-09 VITALS — WEIGHT: 133.4 LBS | DIASTOLIC BLOOD PRESSURE: 73 MMHG | SYSTOLIC BLOOD PRESSURE: 126 MMHG | BODY MASS INDEX: 23.44 KG/M2

## 2018-05-09 DIAGNOSIS — O60.00 PRETERM LABOR WITHOUT DELIVERY: ICD-10-CM

## 2018-05-09 DIAGNOSIS — Z3A.32 32 WEEKS GESTATION OF PREGNANCY: Primary | ICD-10-CM

## 2018-05-09 DIAGNOSIS — R82.71 GBS BACTERIURIA: ICD-10-CM

## 2018-05-09 DIAGNOSIS — O09.521 AMA (ADVANCED MATERNAL AGE) MULTIGRAVIDA 35+, FIRST TRIMESTER: ICD-10-CM

## 2018-05-09 DIAGNOSIS — O09.523 ELDERLY MULTIGRAVIDA IN THIRD TRIMESTER: ICD-10-CM

## 2018-05-09 DIAGNOSIS — O34.219 PREVIOUS CESAREAN DELIVERY AFFECTING PREGNANCY: ICD-10-CM

## 2018-05-09 DIAGNOSIS — Z82.79 FAMILY HISTORY OF CONGENITAL HEART DEFECT: ICD-10-CM

## 2018-05-09 DIAGNOSIS — O09.523 ELDERLY MULTIGRAVIDA IN THIRD TRIMESTER: Primary | ICD-10-CM

## 2018-05-09 DIAGNOSIS — Z3A.30 30 WEEKS GESTATION OF PREGNANCY: ICD-10-CM

## 2018-05-09 PROCEDURE — 76816 OB US FOLLOW-UP PER FETUS: CPT

## 2018-05-09 PROCEDURE — 76816 OB US FOLLOW-UP PER FETUS: CPT | Performed by: OBSTETRICS & GYNECOLOGY

## 2018-05-09 PROCEDURE — 0502F SUBSEQUENT PRENATAL CARE: CPT | Performed by: OBSTETRICS & GYNECOLOGY

## 2018-05-09 NOTE — PROGRESS NOTES
Denies problems. Reports received 2 alerts from her I-watch last night that her heart rate had exceeded 120 (159-162). She was sleeping and unaware of any symptoms; plans to discuss with Dr. Villagomez today. MHR = 91.

## 2018-05-09 NOTE — PROGRESS NOTES
Chief Complaint   Patient presents with   • Routine Prenatal Visit     u/s done at [facility] today       HPI: Francesca is a  currently at 32w2d who today reports the following: baby is very active  Contractions - No; Leaking - No; Vaginal bleeding -  No; Swelling of extremities - YES.hands > feet minimal  ROS:  GI: Nausea - yes and GERD - OTC help some; Reglan made her jittery in the past;  Constipation - No; Diarrhea - No    Neuro: Headache - No; Visual change - No      EXAM:  Vitals: See prenatal flowsheet   Abdomen: See prenatal flowsheet   Urine glucose/protein: See prenatal flowsheet   Pelvic: See prenatal flowsheet   MDM:   Impression: 1. Supervision of high risk pregnancy  2. Previous C/S with planned repeat C/S  3. fetal lag in AC  4. AMA  5. GERD in pregnancy   6. Apple watch alert that HR > 120 ~ 160 during sleep; no symptoms during the day/ dreaming?  7. Good FM   8. histroy of CHRISTIAN in past and 2 prior sections   Tests done today: 1. U/S for EFW - at [facility]   Topics discussed: 1. Continue with PNV's  2. Prenatal labs reviewed  3. see all above   4. Schedule repeat   given hx of CHRISTIAN and 2 prior sections along lag in AC   Tests scheduled today for her next visit:   none

## 2018-05-16 ENCOUNTER — ROUTINE PRENATAL (OUTPATIENT)
Dept: OBSTETRICS AND GYNECOLOGY | Facility: CLINIC | Age: 38
End: 2018-05-16

## 2018-05-16 ENCOUNTER — TELEPHONE (OUTPATIENT)
Dept: OBSTETRICS AND GYNECOLOGY | Facility: CLINIC | Age: 38
End: 2018-05-16

## 2018-05-16 VITALS — WEIGHT: 133 LBS | SYSTOLIC BLOOD PRESSURE: 112 MMHG | DIASTOLIC BLOOD PRESSURE: 60 MMHG | BODY MASS INDEX: 23.37 KG/M2

## 2018-05-16 DIAGNOSIS — O36.5990 POOR FETAL GROWTH AFFECTING MANAGEMENT OF MOTHER, ANTEPARTUM, SINGLE OR UNSPECIFIED FETUS: Primary | ICD-10-CM

## 2018-05-16 PROCEDURE — 0502F SUBSEQUENT PRENATAL CARE: CPT | Performed by: OBSTETRICS & GYNECOLOGY

## 2018-05-16 RX ORDER — PRENATAL VIT/IRON FUM/FOLIC AC 27MG-0.8MG
TABLET ORAL
COMMUNITY
Start: 2018-05-15 | End: 2018-11-20 | Stop reason: SDUPTHER

## 2018-05-16 NOTE — PROGRESS NOTES
Chief Complaint   Patient presents with   • Routine Prenatal Visit     nst       HPI: Francesca is a  currently at 33w2d who today reports the following:  Contractions - No; Leaking - No; Vaginal bleeding -  No; Swelling of extremities - No.    ROS:  GI: Nausea - improved as compared to the prior visit; Constipation - improved as compared to the prior visit; Diarrhea - No    Neuro: Headache - No; Visual change - No      EXAM:  Vitals: See prenatal flowsheet   Abdomen: See prenatal flowsheet   Urine glucose/protein: See prenatal flowsheet   Pelvic: See prenatal flowsheet   MDM:   Impression: 1. Supervision of high risk pregnancy  2. small AC 1% so NST'sa   Tests done today: 1. NST - reactive   Topics discussed: 1. Continue with PNV's  2. Prenatal labs reviewed  3. see above   4. Kick count sheets - call office or go to L&D prn decreased FM   Tests scheduled today for her next visit:   NST

## 2018-05-16 NOTE — TELEPHONE ENCOUNTER
----- Message from Micah Villagomez MD sent at 5/16/2018  8:24 AM EDT -----  Regarding: FW: FW: FW: FW:Urine culture  Contact: [phone]  Can you add an NST weekly for Francesca? Could work in this am - light schedule.  She is concerned regarding small AC    ----- Message -----  From: Syeda Grant MA  Sent: 5/16/2018   8:18 AM  To: Micah Villagomez MD  Subject: FW: FW: FW: FW:Urine culture                         ----- Message -----  From: Francesca Castillo  Sent: 5/15/2018   6:32 PM  To: [facility]  Subject: RE: FW: FW: FW:Urine culture                     Currently my follow up ultrasound is scheduled with your office on 5/30.    As far as movement, I previously joked that he moves constantly.  He has definitely slowed down over the last few days but is still moving.  I’ve also had a number of contractions today.  Is it possible to go ahead with the additional test?  ----- Message -----  From: Micah Villagomez MD  Sent: 5/15/2018  4:56 PM EDT  To: Francesca Castillo  Subject: RE: FW: FW: FW:Urine culture  The overall growth is normal especially the head. Let us know if fetal movement decreases - if so we will do NST's. I think Dr Heath has a follow up ultrasound scheduled.    ----- Message -----  From: Francesca Castillo  Sent: 5/15/2018   9:43 AM  To: [facility]  Subject: RE: FW: FW:Urine culture                         Dr. Villagomez,    As a follow up on my appointment and the [facility] ultrasound, how concerned souls I be at this point about the abdominal growth lag??  I seem to find myself worrying about it quite a bit.    Thanks,    Francesca  ----- Message -----  From: Micah Villagomez MD  Sent: 3/28/2018 10:26 AM EDT  To: Francesca Castillo  Subject: RE: FW: FW:Urine culture  Francesca, my Gen. is that if you are breathing through the contractions and they are longer than 30 seconds and coming every 10-12 minutes or closer despite fluids and rest I would like to see you in the office if late at night  in labor delivery.  If that is the case today call the office and they can work nightly    ----- Message -----  From: Francesca Castillo  Sent: 3/28/2018   8:09 AM  To: [facility] [employer]  Subject: RE: FW:Urine culture                             Dr. Villagomez,    At what point do I need to be reseen for the contractions?  The ones I've had previously have been painless; however, last night I had some that were very strong despite being well hydrated and laying on my left side.  I am a little concerned that this is happening way too early (26 weeks).  I didn't have contractions this early with either of my previous pregnancies.  My next appointment is currently scheduled for a week from today (April 4th).  ----- Message -----  From: Micah Villagomez MD  Sent: 3/21/2018  2:20 PM EDT  To: Francesca Castillo  Subject: RE: FW:Urine culture  The sciatic pain requires changes in position.  Possibly a maternity belt/sling would be helpful.  That pain is most likely assessed with fetal position.  You could try heating pad or Tylenol if it gets worse.  The good news is I don't think you have had any issues with premature labor with ramirez pregnancies.    ----- Message -----  From: Francesca Castillo  Sent: 3/21/2018  11:21 AM  To: [facility] [employer]  Subject: RE:Urine culture                                 Thank you - that is great news! I am still having contractions, though not as frequently.  I am also having some pain that seems to be sciatica (lower back almost to my butt) that hurts  when I'm trying to walk or move around.  I've been trying to relax and drink as much water as possible.  Is there anything else I can do - or should I be concerned?  ----- Message -----  From: Micah Villagomez MD  Sent: 3/21/2018 10:14 AM EDT  To: Francesca Castillo  Subject: Urine culture  Francesca, the culture showed only normal vaginal ashleigh.  There is no bladder infection.  Any questions give me a call [phone] thank you   Hernando

## 2018-05-22 ENCOUNTER — ROUTINE PRENATAL (OUTPATIENT)
Dept: OBSTETRICS AND GYNECOLOGY | Facility: CLINIC | Age: 38
End: 2018-05-22

## 2018-05-22 VITALS — WEIGHT: 135 LBS | SYSTOLIC BLOOD PRESSURE: 110 MMHG | BODY MASS INDEX: 23.73 KG/M2 | DIASTOLIC BLOOD PRESSURE: 60 MMHG

## 2018-05-22 DIAGNOSIS — Z3A.32 32 WEEKS GESTATION OF PREGNANCY: ICD-10-CM

## 2018-05-22 DIAGNOSIS — O09.523 ELDERLY MULTIGRAVIDA IN THIRD TRIMESTER: Primary | ICD-10-CM

## 2018-05-22 PROCEDURE — 0502F SUBSEQUENT PRENATAL CARE: CPT | Performed by: OBSTETRICS & GYNECOLOGY

## 2018-05-22 PROCEDURE — 59025 FETAL NON-STRESS TEST: CPT | Performed by: OBSTETRICS & GYNECOLOGY

## 2018-05-22 NOTE — PROGRESS NOTES
Chief Complaint   Patient presents with   • Routine Prenatal Visit     nst       HPI: Francesca is a  currently at 34w1d who today reports the following:  Contractions - YES - but less than 4/hour AND no associated change in vaginal discharge; Leaking - No; Vaginal bleeding -  No; Swelling of extremities - No.    ROS:  GI: Nausea - yes if she doesn't eat - trying snacks but her kids eat them! Constipation - No; Diarrhea - improved as compared to the prior visit    Neuro: Headache - yes -sinus vs stress?; Visual change - No      EXAM:  Vitals: See prenatal flowsheet   Abdomen: See prenatal flowsheet   Urine glucose/protein: See prenatal flowsheet   Pelvic: See prenatal flowsheet   MDM:   Impression: 1. Supervision of high risk pregnancy  2. SGa - low AC    Tests done today: 1. NST - reactive   Topics discussed: 1. Continue with PNV's  2. Prenatal labs reviewed  3. labor signs and symptoms  4.  labor signs and symptoms   Tests scheduled today for her next visit:   NST and Ultrasound here next week

## 2018-05-30 ENCOUNTER — ROUTINE PRENATAL (OUTPATIENT)
Dept: OBSTETRICS AND GYNECOLOGY | Facility: CLINIC | Age: 38
End: 2018-05-30

## 2018-05-30 VITALS — WEIGHT: 135 LBS | BODY MASS INDEX: 23.73 KG/M2 | SYSTOLIC BLOOD PRESSURE: 102 MMHG | DIASTOLIC BLOOD PRESSURE: 60 MMHG

## 2018-05-30 DIAGNOSIS — O34.219 PREVIOUS CESAREAN DELIVERY AFFECTING PREGNANCY: ICD-10-CM

## 2018-05-30 DIAGNOSIS — R82.71 GBS BACTERIURIA: ICD-10-CM

## 2018-05-30 DIAGNOSIS — O09.523 ELDERLY MULTIGRAVIDA IN THIRD TRIMESTER: ICD-10-CM

## 2018-05-30 PROBLEM — Z3A.35 35 WEEKS GESTATION OF PREGNANCY: Status: ACTIVE | Noted: 2017-11-16

## 2018-05-30 PROCEDURE — 59025 FETAL NON-STRESS TEST: CPT | Performed by: OBSTETRICS & GYNECOLOGY

## 2018-05-30 PROCEDURE — 0502F SUBSEQUENT PRENATAL CARE: CPT | Performed by: OBSTETRICS & GYNECOLOGY

## 2018-05-30 NOTE — PROGRESS NOTES
Chief Complaint   Patient presents with   • Routine Prenatal Visit     nst / u/s       HPI: Francesca is a  currently at 35w2d who today reports the following: Good fetal movement.  Contractions - YES - but less than 4/hour AND no associated change in vaginal discharge dollars a few I discussed all options is a little longer but she had about 3 cc the way; Leaking - No; Vaginal bleeding -  No; Swelling of extremities - improved as compared to the prior visit.    ROS:  GI: Nausea - No; Constipation - No; Diarrhea - No    Neuro: Headache - No; Visual change - No      EXAM:  Vitals: See prenatal flowsheet   Abdomen: See prenatal flowsheet   Urine glucose/protein: See prenatal flowsheet   Pelvic: See prenatal flowsheet   MDM:   Impression: 1. Supervision of high risk pregnancy  2. Previous C/S with planned repeat C/S  3. SGA with improved abdominal circumference today  4. AMA   Tests done today: 1. NST - reactive   Topics discussed: 1. Continue with PNV's  2. Prenatal labs reviewed-Along with today's ultrasound which showed normal fluid and estimated fetal weight of 34% and 5 lbs. 8 oz.  3. There had been a lag in the abdominal circumference.  Today the abdominal circumference is in the 29th percentile femur 18th percentile overall S/P fetal weight 34th percentile.  I think this shows variation and measuring technique.  Will continue weekly NSTs   4. Timing of delivery discussed.  Think she would like this earlier than 39 weeks and I told her that might a been the case heavy abdominal circumference been less than 5 percentile    Tests scheduled today for her next visit:   NST

## 2018-06-07 ENCOUNTER — ROUTINE PRENATAL (OUTPATIENT)
Dept: OBSTETRICS AND GYNECOLOGY | Facility: CLINIC | Age: 38
End: 2018-06-07

## 2018-06-07 VITALS — SYSTOLIC BLOOD PRESSURE: 106 MMHG | WEIGHT: 137 LBS | BODY MASS INDEX: 24.08 KG/M2 | DIASTOLIC BLOOD PRESSURE: 60 MMHG

## 2018-06-07 DIAGNOSIS — O09.523 ELDERLY MULTIGRAVIDA IN THIRD TRIMESTER: ICD-10-CM

## 2018-06-07 DIAGNOSIS — R82.71 GBS BACTERIURIA: ICD-10-CM

## 2018-06-07 DIAGNOSIS — Z3A.35 35 WEEKS GESTATION OF PREGNANCY: Primary | ICD-10-CM

## 2018-06-07 DIAGNOSIS — Z82.79 FAMILY HISTORY OF CONGENITAL HEART DEFECT: ICD-10-CM

## 2018-06-07 PROCEDURE — 0502F SUBSEQUENT PRENATAL CARE: CPT | Performed by: OBSTETRICS & GYNECOLOGY

## 2018-06-07 PROCEDURE — 59025 FETAL NON-STRESS TEST: CPT | Performed by: OBSTETRICS & GYNECOLOGY

## 2018-06-07 NOTE — PROGRESS NOTES
Chief Complaint   Patient presents with   • Routine Prenatal Visit     nst today   • Edema       HPI: Francesca is a  currently at 36w3d who today reports the following:  Contractions - YES - but less than 4/hour AND no associated change in vaginal discharge; Leaking - Some increase in vaginal discharge; Vaginal bleeding -  No; Swelling of extremities - No.    ROS:  GI: Nausea - No; Constipation - No; Diarrhea - No    Neuro: Headache - No; Visual change - No      EXAM:  Vitals: See prenatal flowsheet   Abdomen: See prenatal flowsheet   Urine glucose/protein: See prenatal flowsheet   Pelvic: See prenatal flowsheet   MDM:   Impression: 1. Supervision of high risk pregnancy  2. Previous C/S with planned repeat C/S  3. Lag in abdominal circumference   Tests done today: 1. NST - reactive   Topics discussed: 1. Continue with PNV's  2. Prenatal labs reviewed  3. labor signs and symptoms   Tests scheduled today for her next visit:   none

## 2018-06-14 ENCOUNTER — ROUTINE PRENATAL (OUTPATIENT)
Dept: OBSTETRICS AND GYNECOLOGY | Facility: CLINIC | Age: 38
End: 2018-06-14

## 2018-06-14 VITALS — SYSTOLIC BLOOD PRESSURE: 112 MMHG | BODY MASS INDEX: 24.25 KG/M2 | DIASTOLIC BLOOD PRESSURE: 70 MMHG | WEIGHT: 138 LBS

## 2018-06-14 DIAGNOSIS — O09.523 ELDERLY MULTIGRAVIDA IN THIRD TRIMESTER: ICD-10-CM

## 2018-06-14 DIAGNOSIS — O34.219 PREVIOUS CESAREAN DELIVERY AFFECTING PREGNANCY: ICD-10-CM

## 2018-06-14 PROBLEM — Z3A.37 37 WEEKS GESTATION OF PREGNANCY: Status: ACTIVE | Noted: 2017-11-16

## 2018-06-14 PROCEDURE — 59025 FETAL NON-STRESS TEST: CPT | Performed by: OBSTETRICS & GYNECOLOGY

## 2018-06-14 PROCEDURE — 0502F SUBSEQUENT PRENATAL CARE: CPT | Performed by: OBSTETRICS & GYNECOLOGY

## 2018-06-14 NOTE — PROGRESS NOTES
Chief Complaint   Patient presents with   • Routine Prenatal Visit     NST       HPI: Francesca is a  currently at 37w3d who today reports the following:  Contractions - YES - but less than 4/hour AND no associated change in vaginal discharge; Leaking - No; Vaginal bleeding -  No; Swelling of extremities - improved as compared to the prior visit.    ROS:  GI: Nausea - No; Constipation - improved as compared to the prior visit; Diarrhea - improved as compared to the prior visit    Neuro: Headache - No; Visual change - No      EXAM:  Vitals: See prenatal flowsheet   Abdomen: See prenatal flowsheet   Urine glucose/protein: See prenatal flowsheet   Pelvic: See prenatal flowsheet   MDM:   Impression: 1. Supervision of high risk pregnancy  2. Previous C/S with planned repeat C/S  3. lag in AC   Tests done today: 1. NST - reactive   Topics discussed: 1. Continue with PNV's  2. Prenatal labs reviewed  3. none - she had no major complaints,questions or concerns   Tests scheduled today for her next visit:   none

## 2018-06-20 ENCOUNTER — ROUTINE PRENATAL (OUTPATIENT)
Dept: OBSTETRICS AND GYNECOLOGY | Facility: CLINIC | Age: 38
End: 2018-06-20

## 2018-06-20 VITALS — DIASTOLIC BLOOD PRESSURE: 60 MMHG | SYSTOLIC BLOOD PRESSURE: 106 MMHG | WEIGHT: 139 LBS | BODY MASS INDEX: 24.43 KG/M2

## 2018-06-20 DIAGNOSIS — O36.8120 DECREASED FETAL MOVEMENTS IN SECOND TRIMESTER, SINGLE OR UNSPECIFIED FETUS: Primary | ICD-10-CM

## 2018-06-20 DIAGNOSIS — Z3A.39 39 WEEKS GESTATION OF PREGNANCY: ICD-10-CM

## 2018-06-20 DIAGNOSIS — O34.219 PREVIOUS CESAREAN DELIVERY AFFECTING PREGNANCY: ICD-10-CM

## 2018-06-20 PROCEDURE — 59025 FETAL NON-STRESS TEST: CPT | Performed by: OBSTETRICS & GYNECOLOGY

## 2018-06-20 PROCEDURE — 0502F SUBSEQUENT PRENATAL CARE: CPT | Performed by: OBSTETRICS & GYNECOLOGY

## 2018-06-20 RX ORDER — MISOPROSTOL 100 UG/1
800 TABLET ORAL AS NEEDED
Status: CANCELLED | OUTPATIENT
Start: 2018-06-20

## 2018-06-20 RX ORDER — METHYLERGONOVINE MALEATE 0.2 MG/ML
200 INJECTION INTRAVENOUS AS NEEDED
Status: CANCELLED | OUTPATIENT
Start: 2018-06-20

## 2018-06-20 RX ORDER — PROMETHAZINE HYDROCHLORIDE 25 MG/ML
12.5 INJECTION, SOLUTION INTRAMUSCULAR; INTRAVENOUS EVERY 6 HOURS PRN
Status: CANCELLED | OUTPATIENT
Start: 2018-06-20

## 2018-06-20 RX ORDER — PROMETHAZINE HYDROCHLORIDE 25 MG/1
12.5 SUPPOSITORY RECTAL EVERY 6 HOURS PRN
Status: CANCELLED | OUTPATIENT
Start: 2018-06-20

## 2018-06-20 RX ORDER — ZOLPIDEM TARTRATE 5 MG/1
5 TABLET ORAL NIGHTLY PRN
Status: CANCELLED | OUTPATIENT
Start: 2018-06-20 | End: 2018-06-30

## 2018-06-20 RX ORDER — ACETAMINOPHEN 325 MG/1
650 TABLET ORAL EVERY 4 HOURS PRN
Status: CANCELLED | OUTPATIENT
Start: 2018-06-20

## 2018-06-20 RX ORDER — HYDROCODONE BITARTRATE AND ACETAMINOPHEN 5; 325 MG/1; MG/1
1 TABLET ORAL EVERY 4 HOURS PRN
Status: CANCELLED | OUTPATIENT
Start: 2018-06-20 | End: 2018-06-30

## 2018-06-20 RX ORDER — IBUPROFEN 200 MG
600 TABLET ORAL EVERY 6 HOURS PRN
Status: CANCELLED | OUTPATIENT
Start: 2018-06-20

## 2018-06-20 RX ORDER — CARBOPROST TROMETHAMINE 250 UG/ML
250 INJECTION, SOLUTION INTRAMUSCULAR AS NEEDED
Status: CANCELLED | OUTPATIENT
Start: 2018-06-20

## 2018-06-20 RX ORDER — DIPHENHYDRAMINE HCL 25 MG
25 CAPSULE ORAL NIGHTLY PRN
Status: CANCELLED | OUTPATIENT
Start: 2018-06-20

## 2018-06-20 RX ORDER — DIPHENHYDRAMINE HYDROCHLORIDE 50 MG/ML
25 INJECTION INTRAMUSCULAR; INTRAVENOUS NIGHTLY PRN
Status: CANCELLED | OUTPATIENT
Start: 2018-06-20

## 2018-06-20 RX ORDER — LIDOCAINE HYDROCHLORIDE 10 MG/ML
5 INJECTION, SOLUTION EPIDURAL; INFILTRATION; INTRACAUDAL; PERINEURAL AS NEEDED
Status: CANCELLED | OUTPATIENT
Start: 2018-06-20

## 2018-06-20 RX ORDER — SODIUM CHLORIDE 0.9 % (FLUSH) 0.9 %
1-10 SYRINGE (ML) INJECTION AS NEEDED
Status: CANCELLED | OUTPATIENT
Start: 2018-06-20

## 2018-06-20 RX ORDER — PROMETHAZINE HYDROCHLORIDE 25 MG/1
12.5 TABLET ORAL EVERY 6 HOURS PRN
Status: CANCELLED | OUTPATIENT
Start: 2018-06-20

## 2018-06-20 NOTE — H&P
Obstetric History and Physical    Chief Complaint   Patient presents with   • Routine Prenatal Visit     nst       Subjective     Patient is a 38 y.o. female  currently at 38w2d, who presents  For pre- operative visit and NST for slight lag in fetal abdominal circumference in the past.    Her prenatal care is complicated by  prior   desires repeat .  Her previous obstetric/gynecological history is noted for is remarkable for  twins.    The following portions of the patients history were reviewed and updated as appropriate: past medical history, past surgical history and problem list .       Prenatal Information:  Prenatal Results     Initial Prenatal Labs     Test Value Reference Range Date Time    Hemoglobin 14.3 g/dL 11.1 - 15.9 g/dL 17 1455    Hematocrit 42.3 % 34.0 - 46.6 % 17 1455    Platelets 222 x10E3/uL 150 - 379 x10E3/uL 17 1455    Rubella IgG 2.64 index Immune >0.99 index 17 1455    Hepatitis B SAg Negative  Negative 17 1455    Hepatitis C Ab        RPR Non Reactive  Non Reactive 17 1455    ABO O   17 1455    Rh Positive   17 1455    Antibody Screen Negative  Negative 17 1455    HIV Non Reactive  Non Reactive 17 1455    Urine Culture >100,000 CFU/mL Normal Urogenital [place]   18 1311    Gonorrhea        Chlamydia        TSH              2nd and 3rd Trimester     Test Value Reference Range Date Time    Hemoglobin (repeated) 11.6 g/dL 11.5 - 15.5 g/dL 18 0938    Hematocrit (repeated)         mg/dL 65 - 139 mg/dL 18 0938    Antibody Screen (repeated)        GTT Fasting        GTT 1 Hr        GTT 2 Hr        GTT 3 Hr        Group B Strep              Drug Screening     Test Value Reference Range Date Time    Amphetamine Screen        Barbiturate Screen        Benzodiazepine Screen        Methadone Screen        Phencyclidine Screen        Opiates Screen        THC Screen        Cocaine Screen         Propoxyphene Screen        Buprenorphine Screen        Methamphetamine Screen        Oxycodone Screen        Tryicyclic Antidepressants Screen              Other (Risk screening)     Test Value Reference Range Date Time    Varicella IgG        Parvovirus IgG        CMV IgG        Cystic Fibrosis        Hemoglobin electrophoresis        NIPT low risk /  male   17     MSAFP-4        AFP (for NTD only) negative    01/10/18               External Prenatal Results     Pregnancy Outside Results - these were transcribed from office records.  See scanned records for details.     Test Value Date Time    Hgb 11.6 g/dL 18 0938    Hct 42.3 % 17 1455    ABO O  17 1455    Rh Positive  17 1455    Antibody Screen Negative  17 1455    Glucose Fasting GTT       Glucose Tolerance Test 1 hour       Glucose Tolerance Test 3 hour       Gonorrhea (discrete)       Chlamydia (discrete)       RPR Non Reactive  17 1455    VDRL       Syphillis Antibody       Rubella 2.64 index 17 1455    HBsAg Negative  17 1455    Herpes Simplex Virus PCR       Herpes Simplex VIrus Culture       HIV Non Reactive  17 1455    Hep C RNA Quant PCR       Hep C Antibody       AFP       Group B Strep       GBS Susceptibility to Clindamycin       GBS Susceptibility to Eythromycin       Fetal Fibronectin       Genetic Testing, Maternal Blood             Drug Screening     Test Value Date Time    Urine Drug Screen       Amphetamine Screen       Barbiturate Screen       Benzodiazepine Screen       Methadone Screen       Phencyclidine Screen       Opiates Screen       THC Screen       Cocaine Screen       Propoxyphene Screen       Buprenorphine Screen       Methamphetamine Screen       Oxycodone Screen       Tryicyclic Antidepressants Screen                    Past OB History:     Obstetric History       T1      L3     SAB0   TAB0   Ectopic0   Molar0   Multiple1   Live Births3       # Outcome Date  GA Lbr Tobin/2nd Weight Sex Delivery Anes PTL Lv   3 Current            2A  09/16/10 33w4d  1701 g (3 lb 12 oz) M Vag-Spont  Y NARA      Name: Simon      Complications: Premature labor   2B  09/16/10 33w4d  1389 g (3 lb 1 oz) F CS-LTranv  Y NARA      Name: Lino      Complications: Premature labor   1 Term 02/15/08 39w0d  3147 g (6 lb 15 oz) F CS-LTranv  N NARA      Name: Meg      Complications: Breech presentation          Past Medical History: Past Medical History:   Diagnosis Date   • Asthma     as a child   • Frequent headaches    • History of abnormal cervical Pap smear     colpo with bx wnl      Past Surgical History Past Surgical History:   Procedure Laterality Date   • BREAST SURGERY      Implants   •  SECTION     •  SECTION     • WISDOM TOOTH EXTRACTION        Family History: Family History   Problem Relation Age of Onset   • Heart disease Paternal Grandfather       Social History:  reports that she quit smoking about 14 years ago. Her smoking use included Cigarettes. She has never used smokeless tobacco.   reports that she does not drink alcohol.   reports that she does not use drugs.        General ROS: Pertinent items are noted in HPI    Objective       Vital Signs Range for the last 24 hours  Temperature:     Temp Source:     BP: BP: (106)/(60) 106/60   Pulse:     Respirations:     SPO2:     O2 Amount (l/min):     O2 Devices     Weight: Weight:  [63 kg (139 lb)] 63 kg (139 lb)     Physical Examination: General appearance - alert, well appearing, and in no distress and oriented to person, place, and time  Mental status - alert, oriented to person, place, and time, normal mood, behavior, speech, dress, motor activity, and thought processes  Chest - clear to auscultation, no wheezes, rales or rhonchi, symmetric air entry  Heart - normal rate and regular rhythm  Abdomen - soft, nontender, gravid  Neurological - DTR's normal and symmetric  Musculoskeletal - no joint  tenderness, deformity or swelling, no muscular tenderness noted  Extremities - no pedal edema noted  Skin - normal coloration and turgor, no rashes, no suspicious skin lesions noted                            Fetal Heart Rate Assessment ; reactive NST                                      Assessment:  1.  Intrauterine pregnancy at 38w2d weeks gestation with reassuring fetal status.    2.  Repeat  scheduled for  at 1:30  3.  Obstetrical history as noted above.    Plan:  1. NPO etc. Labs day of surgery ; she has skin prep  2. Plan of care has been reviewed with patient.  3.  Risks, benefits of treatment plan have been discussed.  4.  All questions have been answered.        Micah Villagomez MD  2018  12:43 PM

## 2018-06-20 NOTE — PROGRESS NOTES
Chief Complaint   Patient presents with   • Routine Prenatal Visit     nst       HPI: Francesca is a  currently at 38w2d who today reports the following:  Contractions - YES - but less than 4/hour AND no associated change in vaginal discharge; Leaking - No; Vaginal bleeding -  No; Swelling of extremities - YES.    ROS:  GI: Nausea - No; Constipation - No; Diarrhea - No    Neuro: Headache - YES; Visual change - No      EXAM:  Vitals: See prenatal flowsheet   Abdomen: See prenatal flowsheet   Urine glucose/protein: See prenatal flowsheet   Pelvic: See prenatal flowsheet   MDM:   Impression: 1. Supervision of high risk pregnancy  2. Previous C/S with planned repeat C/S  3. decreased FM  4. Benign edema in pregnancy   5. Left ear ache/itch/throbing- non tender on exam   Tests done today: 1. NST - reactive   Topics discussed: 1. Continue with PNV's  2. Prenatal labs reviewed  3. see above   Tests scheduled today for her next visit:   Repeat

## 2018-06-22 RX ORDER — PROMETHAZINE HYDROCHLORIDE 25 MG/ML
12.5 INJECTION, SOLUTION INTRAMUSCULAR; INTRAVENOUS EVERY 6 HOURS PRN
Status: CANCELLED | OUTPATIENT
Start: 2018-06-22

## 2018-06-22 RX ORDER — IBUPROFEN 200 MG
600 TABLET ORAL EVERY 6 HOURS PRN
Status: CANCELLED | OUTPATIENT
Start: 2018-06-22

## 2018-06-22 RX ORDER — ZOLPIDEM TARTRATE 5 MG/1
5 TABLET ORAL NIGHTLY PRN
Status: CANCELLED | OUTPATIENT
Start: 2018-06-22 | End: 2018-07-02

## 2018-06-22 RX ORDER — PROMETHAZINE HYDROCHLORIDE 25 MG/1
12.5 TABLET ORAL EVERY 6 HOURS PRN
Status: CANCELLED | OUTPATIENT
Start: 2018-06-22

## 2018-06-22 RX ORDER — PROMETHAZINE HYDROCHLORIDE 25 MG/1
12.5 SUPPOSITORY RECTAL EVERY 6 HOURS PRN
Status: CANCELLED | OUTPATIENT
Start: 2018-06-22

## 2018-06-22 RX ORDER — SODIUM CHLORIDE 0.9 % (FLUSH) 0.9 %
1-10 SYRINGE (ML) INJECTION AS NEEDED
Status: CANCELLED | OUTPATIENT
Start: 2018-06-22

## 2018-06-22 RX ORDER — HYDROCODONE BITARTRATE AND ACETAMINOPHEN 5; 325 MG/1; MG/1
1 TABLET ORAL EVERY 4 HOURS PRN
Status: CANCELLED | OUTPATIENT
Start: 2018-06-22 | End: 2018-07-02

## 2018-06-22 RX ORDER — METHYLERGONOVINE MALEATE 0.2 MG/ML
200 INJECTION INTRAVENOUS AS NEEDED
Status: CANCELLED | OUTPATIENT
Start: 2018-06-22

## 2018-06-22 RX ORDER — LIDOCAINE HYDROCHLORIDE 10 MG/ML
5 INJECTION, SOLUTION EPIDURAL; INFILTRATION; INTRACAUDAL; PERINEURAL AS NEEDED
Status: CANCELLED | OUTPATIENT
Start: 2018-06-22

## 2018-06-22 RX ORDER — ACETAMINOPHEN 325 MG/1
650 TABLET ORAL EVERY 4 HOURS PRN
Status: CANCELLED | OUTPATIENT
Start: 2018-06-22

## 2018-06-22 RX ORDER — DIPHENHYDRAMINE HCL 25 MG
25 CAPSULE ORAL NIGHTLY PRN
Status: CANCELLED | OUTPATIENT
Start: 2018-06-22

## 2018-06-22 RX ORDER — CARBOPROST TROMETHAMINE 250 UG/ML
250 INJECTION, SOLUTION INTRAMUSCULAR AS NEEDED
Status: CANCELLED | OUTPATIENT
Start: 2018-06-22

## 2018-06-22 RX ORDER — DIPHENHYDRAMINE HYDROCHLORIDE 50 MG/ML
25 INJECTION INTRAMUSCULAR; INTRAVENOUS NIGHTLY PRN
Status: CANCELLED | OUTPATIENT
Start: 2018-06-22

## 2018-06-22 RX ORDER — MISOPROSTOL 100 UG/1
800 TABLET ORAL AS NEEDED
Status: CANCELLED | OUTPATIENT
Start: 2018-06-22

## 2018-06-22 NOTE — H&P
Obstetric History and Physical    Chief Complaint   Patient presents with   • Routine Prenatal Visit     nst       Subjective     Patient is a 38 y.o. female  currently at 38w4d, who presents For NST due to history of abdominal circumference lag i.e. SGA.  Follow-up ultrasound did not reveal as significant lag in the abdominal circumference.  Her NST is reactive today.  She is status post  of one twin but then repeat  section for second twin.  Therefore scheduled for repeat .   has had vasectomy no tubal required    Her prenatal care is complicated by  advanced maternal age  genetic screening was normal and prior   desires repeat .  Her previous obstetric/gynecological history is noted for is non-contributory.She did have group B strep bacteriuria.  Should not be a problem scheduled repeat     The following portions of the patients history were reviewed and updated as appropriate: allergies, past medical history, past surgical history and problem list .       Prenatal Information:  Prenatal Results     Initial Prenatal Labs     Test Value Reference Range Date Time    Hemoglobin 14.3 g/dL 11.1 - 15.9 g/dL 17 1455    Hematocrit 42.3 % 34.0 - 46.6 % 17 1455    Platelets 222 x10E3/uL 150 - 379 x10E3/uL 17 1455    Rubella IgG 2.64 index Immune >0.99 index 17 1455    Hepatitis B SAg Negative  Negative 17 1455    Hepatitis C Ab        RPR Non Reactive  Non Reactive 17 1455    ABO O   17 1455    Rh Positive   17 1455    Antibody Screen Negative  Negative 17 1455    HIV Non Reactive  Non Reactive 17 1455    Urine Culture >100,000 CFU/mL Normal Urogenital [place]   18 1311    Gonorrhea        Chlamydia        TSH              2nd and 3rd Trimester     Test Value Reference Range Date Time    Hemoglobin (repeated) 11.6 g/dL 11.5 - 15.5 g/dL 18 0938    Hematocrit (repeated)         mg/dL 65  - 139 mg/dL 03/07/18 0938    Antibody Screen (repeated)        GTT Fasting        GTT 1 Hr        GTT 2 Hr        GTT 3 Hr        Group B Strep              Drug Screening     Test Value Reference Range Date Time    Amphetamine Screen        Barbiturate Screen        Benzodiazepine Screen        Methadone Screen        Phencyclidine Screen        Opiates Screen        THC Screen        Cocaine Screen        Propoxyphene Screen        Buprenorphine Screen        Methamphetamine Screen        Oxycodone Screen        Tryicyclic Antidepressants Screen              Other (Risk screening)     Test Value Reference Range Date Time    Varicella IgG        Parvovirus IgG        CMV IgG        Cystic Fibrosis        Hemoglobin electrophoresis        NIPT low risk /  male   12/08/17     MSAFP-4        AFP (for NTD only) negative    01/10/18               External Prenatal Results     Pregnancy Outside Results - these were transcribed from office records.  See scanned records for details.     Test Value Date Time    Hgb 11.6 g/dL 03/07/18 0938    Hct 42.3 % 12/08/17 1455    ABO O  12/08/17 1455    Rh Positive  12/08/17 1455    Antibody Screen Negative  12/08/17 1455    Glucose Fasting GTT       Glucose Tolerance Test 1 hour       Glucose Tolerance Test 3 hour       Gonorrhea (discrete)       Chlamydia (discrete)       RPR Non Reactive  12/08/17 1455    VDRL       Syphillis Antibody       Rubella 2.64 index 12/08/17 1455    HBsAg Negative  12/08/17 1455    Herpes Simplex Virus PCR       Herpes Simplex VIrus Culture       HIV Non Reactive  12/08/17 1455    Hep C RNA Quant PCR       Hep C Antibody       AFP       Group B Strep       GBS Susceptibility to Clindamycin       GBS Susceptibility to Eythromycin       Fetal Fibronectin       Genetic Testing, Maternal Blood             Drug Screening     Test Value Date Time    Urine Drug Screen       Amphetamine Screen       Barbiturate Screen       Benzodiazepine Screen       Methadone  Screen       Phencyclidine Screen       Opiates Screen       THC Screen       Cocaine Screen       Propoxyphene Screen       Buprenorphine Screen       Methamphetamine Screen       Oxycodone Screen       Tryicyclic Antidepressants Screen                    Past OB History:     Obstetric History       T1      L3     SAB0   TAB0   Ectopic0   Molar0   Multiple1   Live Births3       # Outcome Date GA Lbr Tobin/2nd Weight Sex Delivery Anes PTL Lv   3 Current            2A  09/16/10 33w4d  1701 g (3 lb 12 oz) M Vag-Spont  Y NARA      Name: Montes      Complications: Premature labor   2B  09/16/10 33w4d  1389 g (3 lb 1 oz) F CS-LTranv  Y NARA      Name: Lino      Complications: Premature labor   1 Term 02/15/08 39w0d  3147 g (6 lb 15 oz) F CS-LTranv  N NARA      Name: Meg      Complications: Breech presentation          Past Medical History: Past Medical History:   Diagnosis Date   • Asthma     as a child   • Frequent headaches    • History of abnormal cervical Pap smear     colpo with bx wnl      Past Surgical History Past Surgical History:   Procedure Laterality Date   • BREAST SURGERY      Implants   •  SECTION     •  SECTION     • WISDOM TOOTH EXTRACTION        Family History: Family History   Problem Relation Age of Onset   • Heart disease Paternal Grandfather       Social History:  reports that she quit smoking about 14 years ago. Her smoking use included Cigarettes. She has never used smokeless tobacco.   reports that she does not drink alcohol.   reports that she does not use drugs.        General ROS: Pertinent items are noted in HPI    Objective       Vital Signs Range for the last 24 hours  Temperature:     Temp Source:     BP:     Pulse:     Respirations:     SPO2:     O2 Amount (l/min):     O2 Devices     Weight:       Physical Examination: General appearance - alert, well appearing, and in no distress and oriented to person, place, and time  Mental status -  alert, oriented to person, place, and time, normal mood, behavior, speech, dress, motor activity, and thought processes  Chest - clear to auscultation, no wheezes, rales or rhonchi, symmetric air entry  Heart - normal rate and regular rhythm  Abdomen - soft, nontender, gravid  Neurological - DTR's normal and symmetric  Musculoskeletal - no joint tenderness, deformity or swelling, no muscular tenderness noted  Extremities - no pedal edema noted  Skin - normal coloration and turgor, no rashes, no suspicious skin lesions noted                          Fetal Heart Rate Assessment   Method:   reactive NST    Beats/min:     Baseline:     Varibility:     Accels:     Decels:     Tracing Category:             Assessment:  1.  Intrauterine pregnancy at 38w4d weeks gestation with reassuring fetal status.    2.  Repeat  section be 39 weeks Monday  3.  Obstetrical history as noted above.    Plan:  1.  A skin prep in her possession will use  night nothing by mouth for 8-12 hours preoperatively for scheduled surgery Monday 1:30 PM  2. Plan of care has been reviewed with patient.  3.  Risks, benefits of treatment plan have been discussed.  4.  All questions have been answered.        Micah Villagomez MD  2018

## 2018-06-25 ENCOUNTER — HOSPITAL ENCOUNTER (INPATIENT)
Facility: HOSPITAL | Age: 38
LOS: 3 days | Discharge: HOME | End: 2018-06-28
Attending: OBSTETRICS & GYNECOLOGY | Admitting: OBSTETRICS & GYNECOLOGY

## 2018-06-25 ENCOUNTER — ANESTHESIA EVENT (OUTPATIENT)
Dept: LABOR AND DELIVERY | Facility: HOSPITAL | Age: 38
End: 2018-06-25

## 2018-06-25 ENCOUNTER — ANESTHESIA (OUTPATIENT)
Dept: LABOR AND DELIVERY | Facility: HOSPITAL | Age: 38
End: 2018-06-25

## 2018-06-25 DIAGNOSIS — O34.219 PREVIOUS CESAREAN DELIVERY AFFECTING PREGNANCY: ICD-10-CM

## 2018-06-25 PROBLEM — O34.211 ENCOUNTER FOR MATERNAL CARE FOR LOW TRANSVERSE SCAR FROM REPEAT CESAREAN DELIVERY: Status: ACTIVE | Noted: 2018-06-25

## 2018-06-25 LAB
ABO GROUP BLD: NORMAL
BLD GP AB SCN SERPL QL: NEGATIVE
DEPRECATED RDW RBC AUTO: 45.8 FL (ref 37–54)
ERYTHROCYTE [DISTWIDTH] IN BLOOD BY AUTOMATED COUNT: 14 % (ref 11.3–14.5)
HCT VFR BLD AUTO: 38.9 % (ref 34.5–44)
HGB BLD-MCNC: 13.1 G/DL (ref 11.5–15.5)
MCH RBC QN AUTO: 30.1 PG (ref 27–31)
MCHC RBC AUTO-ENTMCNC: 33.7 G/DL (ref 32–36)
MCV RBC AUTO: 89.4 FL (ref 80–99)
PLATELET # BLD AUTO: 115 10*3/MM3 (ref 150–450)
PMV BLD AUTO: 13.3 FL (ref 6–12)
RBC # BLD AUTO: 4.35 10*6/MM3 (ref 3.89–5.14)
RH BLD: POSITIVE
T&S EXPIRATION DATE: NORMAL
WBC NRBC COR # BLD: 6.29 10*3/MM3 (ref 3.5–10.8)

## 2018-06-25 PROCEDURE — 25010000002 MIDAZOLAM PER 1 MG: Performed by: ANESTHESIOLOGY

## 2018-06-25 PROCEDURE — 25010000003 CEFAZOLIN IN DEXTROSE 2-4 GM/100ML-% SOLUTION: Performed by: OBSTETRICS & GYNECOLOGY

## 2018-06-25 PROCEDURE — 86901 BLOOD TYPING SEROLOGIC RH(D): CPT | Performed by: OBSTETRICS & GYNECOLOGY

## 2018-06-25 PROCEDURE — 85027 COMPLETE CBC AUTOMATED: CPT | Performed by: OBSTETRICS & GYNECOLOGY

## 2018-06-25 PROCEDURE — 25010000003 MORPHINE PER 10 MG: Performed by: ANESTHESIOLOGY

## 2018-06-25 PROCEDURE — 86900 BLOOD TYPING SEROLOGIC ABO: CPT | Performed by: OBSTETRICS & GYNECOLOGY

## 2018-06-25 PROCEDURE — 59025 FETAL NON-STRESS TEST: CPT

## 2018-06-25 PROCEDURE — 25010000002 FENTANYL CITRATE (PF) 100 MCG/2ML SOLUTION: Performed by: ANESTHESIOLOGY

## 2018-06-25 PROCEDURE — 25010000002 ONDANSETRON PER 1 MG: Performed by: ANESTHESIOLOGY

## 2018-06-25 PROCEDURE — 59510 CESAREAN DELIVERY: CPT | Performed by: OBSTETRICS & GYNECOLOGY

## 2018-06-25 PROCEDURE — 86850 RBC ANTIBODY SCREEN: CPT | Performed by: OBSTETRICS & GYNECOLOGY

## 2018-06-25 RX ORDER — ONDANSETRON 2 MG/ML
4 INJECTION INTRAMUSCULAR; INTRAVENOUS EVERY 6 HOURS PRN
Status: DISCONTINUED | OUTPATIENT
Start: 2018-06-25 | End: 2018-06-25 | Stop reason: SDUPTHER

## 2018-06-25 RX ORDER — PROMETHAZINE HYDROCHLORIDE 25 MG/1
25 SUPPOSITORY RECTAL EVERY 6 HOURS PRN
Status: DISCONTINUED | OUTPATIENT
Start: 2018-06-25 | End: 2018-06-28 | Stop reason: HOSPADM

## 2018-06-25 RX ORDER — KETOROLAC TROMETHAMINE 30 MG/ML
30 INJECTION, SOLUTION INTRAMUSCULAR; INTRAVENOUS EVERY 6 HOURS PRN
Status: ACTIVE | OUTPATIENT
Start: 2018-06-25 | End: 2018-06-27

## 2018-06-25 RX ORDER — ONDANSETRON 4 MG/1
4 TABLET, FILM COATED ORAL EVERY 6 HOURS PRN
Status: DISCONTINUED | OUTPATIENT
Start: 2018-06-25 | End: 2018-06-28 | Stop reason: HOSPADM

## 2018-06-25 RX ORDER — PROMETHAZINE HYDROCHLORIDE 12.5 MG/1
12.5 SUPPOSITORY RECTAL EVERY 6 HOURS PRN
Status: DISCONTINUED | OUTPATIENT
Start: 2018-06-25 | End: 2018-06-25 | Stop reason: HOSPADM

## 2018-06-25 RX ORDER — DIPHENHYDRAMINE HYDROCHLORIDE 50 MG/ML
25 INJECTION INTRAMUSCULAR; INTRAVENOUS EVERY 4 HOURS PRN
Status: DISCONTINUED | OUTPATIENT
Start: 2018-06-25 | End: 2018-06-28 | Stop reason: HOSPADM

## 2018-06-25 RX ORDER — ACETAMINOPHEN 325 MG/1
650 TABLET ORAL ONCE AS NEEDED
Status: DISCONTINUED | OUTPATIENT
Start: 2018-06-25 | End: 2018-06-28 | Stop reason: HOSPADM

## 2018-06-25 RX ORDER — IBUPROFEN 600 MG/1
600 TABLET ORAL EVERY 6 HOURS PRN
Status: DISCONTINUED | OUTPATIENT
Start: 2018-06-25 | End: 2018-06-25 | Stop reason: HOSPADM

## 2018-06-25 RX ORDER — BUPIVACAINE HYDROCHLORIDE 5 MG/ML
INJECTION, SOLUTION EPIDURAL; INTRACAUDAL AS NEEDED
Status: DISCONTINUED | OUTPATIENT
Start: 2018-06-25 | End: 2018-06-25 | Stop reason: SURG

## 2018-06-25 RX ORDER — MORPHINE SULFATE 0.5 MG/ML
INJECTION, SOLUTION EPIDURAL; INTRATHECAL; INTRAVENOUS AS NEEDED
Status: DISCONTINUED | OUTPATIENT
Start: 2018-06-25 | End: 2018-06-25 | Stop reason: SURG

## 2018-06-25 RX ORDER — METHYLERGONOVINE MALEATE 0.2 MG/ML
200 INJECTION INTRAVENOUS ONCE AS NEEDED
Status: DISCONTINUED | OUTPATIENT
Start: 2018-06-25 | End: 2018-06-28 | Stop reason: HOSPADM

## 2018-06-25 RX ORDER — PROMETHAZINE HYDROCHLORIDE 25 MG/ML
12.5 INJECTION, SOLUTION INTRAMUSCULAR; INTRAVENOUS EVERY 6 HOURS PRN
Status: DISCONTINUED | OUTPATIENT
Start: 2018-06-25 | End: 2018-06-25 | Stop reason: HOSPADM

## 2018-06-25 RX ORDER — SODIUM CHLORIDE, SODIUM LACTATE, POTASSIUM CHLORIDE, CALCIUM CHLORIDE 600; 310; 30; 20 MG/100ML; MG/100ML; MG/100ML; MG/100ML
150 INJECTION, SOLUTION INTRAVENOUS CONTINUOUS
Status: DISCONTINUED | OUTPATIENT
Start: 2018-06-25 | End: 2018-06-25

## 2018-06-25 RX ORDER — CEFAZOLIN SODIUM 2 G/100ML
2 INJECTION, SOLUTION INTRAVENOUS ONCE
Status: COMPLETED | OUTPATIENT
Start: 2018-06-25 | End: 2018-06-25

## 2018-06-25 RX ORDER — LIDOCAINE HYDROCHLORIDE 10 MG/ML
5 INJECTION, SOLUTION EPIDURAL; INFILTRATION; INTRACAUDAL; PERINEURAL AS NEEDED
Status: DISCONTINUED | OUTPATIENT
Start: 2018-06-25 | End: 2018-06-25 | Stop reason: HOSPADM

## 2018-06-25 RX ORDER — ONDANSETRON 2 MG/ML
4 INJECTION INTRAMUSCULAR; INTRAVENOUS ONCE
Status: DISCONTINUED | OUTPATIENT
Start: 2018-06-25 | End: 2018-06-28 | Stop reason: HOSPADM

## 2018-06-25 RX ORDER — ACETAMINOPHEN 650 MG/1
650 SUPPOSITORY RECTAL EVERY 4 HOURS PRN
Status: DISCONTINUED | OUTPATIENT
Start: 2018-06-25 | End: 2018-06-25 | Stop reason: HOSPADM

## 2018-06-25 RX ORDER — PROMETHAZINE HYDROCHLORIDE 12.5 MG/1
12.5 TABLET ORAL EVERY 6 HOURS PRN
Status: DISCONTINUED | OUTPATIENT
Start: 2018-06-25 | End: 2018-06-25 | Stop reason: HOSPADM

## 2018-06-25 RX ORDER — MISOPROSTOL 200 UG/1
800 TABLET ORAL AS NEEDED
Status: DISCONTINUED | OUTPATIENT
Start: 2018-06-25 | End: 2018-06-25 | Stop reason: HOSPADM

## 2018-06-25 RX ORDER — DIPHENHYDRAMINE HCL 25 MG
25 CAPSULE ORAL NIGHTLY PRN
Status: DISCONTINUED | OUTPATIENT
Start: 2018-06-25 | End: 2018-06-25 | Stop reason: HOSPADM

## 2018-06-25 RX ORDER — ONDANSETRON 2 MG/ML
INJECTION INTRAMUSCULAR; INTRAVENOUS AS NEEDED
Status: DISCONTINUED | OUTPATIENT
Start: 2018-06-25 | End: 2018-06-25 | Stop reason: SURG

## 2018-06-25 RX ORDER — METHYLERGONOVINE MALEATE 0.2 MG/ML
200 INJECTION INTRAVENOUS AS NEEDED
Status: DISCONTINUED | OUTPATIENT
Start: 2018-06-25 | End: 2018-06-25 | Stop reason: HOSPADM

## 2018-06-25 RX ORDER — ACETAMINOPHEN 325 MG/1
650 TABLET ORAL EVERY 4 HOURS PRN
Status: DISCONTINUED | OUTPATIENT
Start: 2018-06-25 | End: 2018-06-25 | Stop reason: HOSPADM

## 2018-06-25 RX ORDER — SODIUM CHLORIDE 0.9 % (FLUSH) 0.9 %
1-10 SYRINGE (ML) INJECTION AS NEEDED
Status: DISCONTINUED | OUTPATIENT
Start: 2018-06-25 | End: 2018-06-25 | Stop reason: HOSPADM

## 2018-06-25 RX ORDER — OXYTOCIN 10 [USP'U]/ML
INJECTION, SOLUTION INTRAMUSCULAR; INTRAVENOUS AS NEEDED
Status: DISCONTINUED | OUTPATIENT
Start: 2018-06-25 | End: 2018-06-25 | Stop reason: SURG

## 2018-06-25 RX ORDER — SODIUM CHLORIDE 0.9 % (FLUSH) 0.9 %
1-10 SYRINGE (ML) INJECTION AS NEEDED
Status: DISCONTINUED | OUTPATIENT
Start: 2018-06-25 | End: 2018-06-28 | Stop reason: HOSPADM

## 2018-06-25 RX ORDER — HYDROCODONE BITARTRATE AND ACETAMINOPHEN 5; 325 MG/1; MG/1
1 TABLET ORAL EVERY 6 HOURS PRN
Status: DISCONTINUED | OUTPATIENT
Start: 2018-06-25 | End: 2018-06-28 | Stop reason: HOSPADM

## 2018-06-25 RX ORDER — NALOXONE HCL 0.4 MG/ML
0.1 VIAL (ML) INJECTION
Status: DISCONTINUED | OUTPATIENT
Start: 2018-06-25 | End: 2018-06-28 | Stop reason: HOSPADM

## 2018-06-25 RX ORDER — IBUPROFEN 600 MG/1
600 TABLET ORAL EVERY 6 HOURS PRN
Status: DISCONTINUED | OUTPATIENT
Start: 2018-06-25 | End: 2018-06-28 | Stop reason: HOSPADM

## 2018-06-25 RX ORDER — PROMETHAZINE HYDROCHLORIDE 25 MG/1
25 TABLET ORAL EVERY 6 HOURS PRN
Status: DISCONTINUED | OUTPATIENT
Start: 2018-06-25 | End: 2018-06-28 | Stop reason: HOSPADM

## 2018-06-25 RX ORDER — PRENATAL VIT/IRON FUM/FOLIC AC 27MG-0.8MG
1 TABLET ORAL DAILY
Status: DISCONTINUED | OUTPATIENT
Start: 2018-06-25 | End: 2018-06-28 | Stop reason: HOSPADM

## 2018-06-25 RX ORDER — HYDROCODONE BITARTRATE AND ACETAMINOPHEN 5; 325 MG/1; MG/1
1 TABLET ORAL EVERY 4 HOURS PRN
Status: DISCONTINUED | OUTPATIENT
Start: 2018-06-25 | End: 2018-06-25 | Stop reason: HOSPADM

## 2018-06-25 RX ORDER — DOCUSATE SODIUM 100 MG/1
100 CAPSULE, LIQUID FILLED ORAL 2 TIMES DAILY PRN
Status: DISCONTINUED | OUTPATIENT
Start: 2018-06-25 | End: 2018-06-28 | Stop reason: HOSPADM

## 2018-06-25 RX ORDER — CARBOPROST TROMETHAMINE 250 UG/ML
250 INJECTION, SOLUTION INTRAMUSCULAR
Status: DISCONTINUED | OUTPATIENT
Start: 2018-06-25 | End: 2018-06-28 | Stop reason: HOSPADM

## 2018-06-25 RX ORDER — METOCLOPRAMIDE HYDROCHLORIDE 5 MG/ML
10 INJECTION INTRAMUSCULAR; INTRAVENOUS ONCE AS NEEDED
Status: DISCONTINUED | OUTPATIENT
Start: 2018-06-25 | End: 2018-06-28 | Stop reason: HOSPADM

## 2018-06-25 RX ORDER — ONDANSETRON 2 MG/ML
4 INJECTION INTRAMUSCULAR; INTRAVENOUS EVERY 6 HOURS PRN
Status: DISCONTINUED | OUTPATIENT
Start: 2018-06-25 | End: 2018-06-28 | Stop reason: HOSPADM

## 2018-06-25 RX ORDER — ZOLPIDEM TARTRATE 5 MG/1
5 TABLET ORAL NIGHTLY PRN
Status: DISCONTINUED | OUTPATIENT
Start: 2018-06-25 | End: 2018-06-25 | Stop reason: HOSPADM

## 2018-06-25 RX ORDER — ACETAMINOPHEN 325 MG/1
650 TABLET ORAL EVERY 4 HOURS PRN
Status: DISCONTINUED | OUTPATIENT
Start: 2018-06-25 | End: 2018-06-28 | Stop reason: HOSPADM

## 2018-06-25 RX ORDER — PRENATAL VIT/IRON FUM/FOLIC AC 27MG-0.8MG
1 TABLET ORAL DAILY
Status: DISCONTINUED | OUTPATIENT
Start: 2018-06-26 | End: 2018-06-25 | Stop reason: SDUPTHER

## 2018-06-25 RX ORDER — FENTANYL CITRATE 50 UG/ML
INJECTION, SOLUTION INTRAMUSCULAR; INTRAVENOUS AS NEEDED
Status: DISCONTINUED | OUTPATIENT
Start: 2018-06-25 | End: 2018-06-25 | Stop reason: SURG

## 2018-06-25 RX ORDER — CARBOPROST TROMETHAMINE 250 UG/ML
250 INJECTION, SOLUTION INTRAMUSCULAR AS NEEDED
Status: DISCONTINUED | OUTPATIENT
Start: 2018-06-25 | End: 2018-06-25 | Stop reason: HOSPADM

## 2018-06-25 RX ORDER — HYDROCODONE BITARTRATE AND ACETAMINOPHEN 7.5; 325 MG/1; MG/1
1 TABLET ORAL EVERY 4 HOURS PRN
Status: DISCONTINUED | OUTPATIENT
Start: 2018-06-25 | End: 2018-06-28 | Stop reason: HOSPADM

## 2018-06-25 RX ORDER — ONDANSETRON 4 MG/1
4 TABLET, FILM COATED ORAL EVERY 8 HOURS PRN
Status: DISCONTINUED | OUTPATIENT
Start: 2018-06-25 | End: 2018-06-25 | Stop reason: SDUPTHER

## 2018-06-25 RX ORDER — SIMETHICONE 80 MG
80 TABLET,CHEWABLE ORAL
Status: DISCONTINUED | OUTPATIENT
Start: 2018-06-25 | End: 2018-06-28 | Stop reason: HOSPADM

## 2018-06-25 RX ORDER — SIMETHICONE 80 MG
80 TABLET,CHEWABLE ORAL 4 TIMES DAILY
Status: DISCONTINUED | OUTPATIENT
Start: 2018-06-25 | End: 2018-06-25 | Stop reason: SDUPTHER

## 2018-06-25 RX ORDER — MIDAZOLAM HYDROCHLORIDE 1 MG/ML
INJECTION INTRAMUSCULAR; INTRAVENOUS AS NEEDED
Status: DISCONTINUED | OUTPATIENT
Start: 2018-06-25 | End: 2018-06-25 | Stop reason: SURG

## 2018-06-25 RX ORDER — BISACODYL 10 MG
10 SUPPOSITORY, RECTAL RECTAL DAILY PRN
Status: DISCONTINUED | OUTPATIENT
Start: 2018-06-25 | End: 2018-06-28 | Stop reason: HOSPADM

## 2018-06-25 RX ORDER — DIPHENHYDRAMINE HYDROCHLORIDE 50 MG/ML
25 INJECTION INTRAMUSCULAR; INTRAVENOUS NIGHTLY PRN
Status: DISCONTINUED | OUTPATIENT
Start: 2018-06-25 | End: 2018-06-25 | Stop reason: HOSPADM

## 2018-06-25 RX ORDER — HYDROXYZINE HYDROCHLORIDE 25 MG/1
50 TABLET, FILM COATED ORAL EVERY 6 HOURS PRN
Status: DISCONTINUED | OUTPATIENT
Start: 2018-06-25 | End: 2018-06-28 | Stop reason: HOSPADM

## 2018-06-25 RX ORDER — PROMETHAZINE HYDROCHLORIDE 25 MG/ML
12.5 INJECTION, SOLUTION INTRAMUSCULAR; INTRAVENOUS EVERY 6 HOURS PRN
Status: DISCONTINUED | OUTPATIENT
Start: 2018-06-25 | End: 2018-06-28 | Stop reason: HOSPADM

## 2018-06-25 RX ORDER — DIPHENHYDRAMINE HCL 25 MG
25 CAPSULE ORAL EVERY 4 HOURS PRN
Status: DISCONTINUED | OUTPATIENT
Start: 2018-06-25 | End: 2018-06-28 | Stop reason: HOSPADM

## 2018-06-25 RX ORDER — ZOLPIDEM TARTRATE 5 MG/1
5 TABLET ORAL NIGHTLY PRN
Status: DISCONTINUED | OUTPATIENT
Start: 2018-06-25 | End: 2018-06-28 | Stop reason: HOSPADM

## 2018-06-25 RX ORDER — ALUMINA, MAGNESIA, AND SIMETHICONE 2400; 2400; 240 MG/30ML; MG/30ML; MG/30ML
15 SUSPENSION ORAL EVERY 4 HOURS PRN
Status: DISCONTINUED | OUTPATIENT
Start: 2018-06-25 | End: 2018-06-28 | Stop reason: HOSPADM

## 2018-06-25 RX ORDER — TRISODIUM CITRATE DIHYDRATE AND CITRIC ACID MONOHYDRATE 500; 334 MG/5ML; MG/5ML
30 SOLUTION ORAL ONCE
Status: COMPLETED | OUTPATIENT
Start: 2018-06-25 | End: 2018-06-25

## 2018-06-25 RX ORDER — LANOLIN 100 %
OINTMENT (GRAM) TOPICAL
Status: DISCONTINUED | OUTPATIENT
Start: 2018-06-25 | End: 2018-06-28 | Stop reason: HOSPADM

## 2018-06-25 RX ADMIN — OXYTOCIN 20 UNITS: 10 INJECTION, SOLUTION INTRAMUSCULAR; INTRAVENOUS at 15:37

## 2018-06-25 RX ADMIN — FENTANYL CITRATE 20 MCG: 50 INJECTION, SOLUTION INTRAMUSCULAR; INTRAVENOUS at 15:20

## 2018-06-25 RX ADMIN — SODIUM CHLORIDE, POTASSIUM CHLORIDE, SODIUM LACTATE AND CALCIUM CHLORIDE 150 ML/HR: 600; 310; 30; 20 INJECTION, SOLUTION INTRAVENOUS at 14:07

## 2018-06-25 RX ADMIN — CEFAZOLIN SODIUM 2 G: 2 INJECTION, SOLUTION INTRAVENOUS at 15:05

## 2018-06-25 RX ADMIN — SODIUM CHLORIDE, POTASSIUM CHLORIDE, SODIUM LACTATE AND CALCIUM CHLORIDE 1000 ML: 600; 310; 30; 20 INJECTION, SOLUTION INTRAVENOUS at 13:04

## 2018-06-25 RX ADMIN — IBUPROFEN 600 MG: 600 TABLET ORAL at 18:37

## 2018-06-25 RX ADMIN — SODIUM CITRATE AND CITRIC ACID MONOHYDRATE 30 ML: 500; 334 SOLUTION ORAL at 15:06

## 2018-06-25 RX ADMIN — HYDROCODONE BITARTRATE AND ACETAMINOPHEN 1 TABLET: 5; 325 TABLET ORAL at 19:37

## 2018-06-25 RX ADMIN — EPHEDRINE SULFATE 10 MG: 50 INJECTION INTRAMUSCULAR; INTRAVENOUS; SUBCUTANEOUS at 15:33

## 2018-06-25 RX ADMIN — ONDANSETRON 4 MG: 2 INJECTION INTRAMUSCULAR; INTRAVENOUS at 15:14

## 2018-06-25 RX ADMIN — MIDAZOLAM HYDROCHLORIDE 1 MG: 1 INJECTION, SOLUTION INTRAMUSCULAR; INTRAVENOUS at 15:15

## 2018-06-25 RX ADMIN — MORPHINE SULFATE 200 MG: 0.5 INJECTION, SOLUTION EPIDURAL; INTRATHECAL; INTRAVENOUS at 15:20

## 2018-06-25 RX ADMIN — BUPIVACAINE HYDROCHLORIDE 2.4 ML: 5 INJECTION, SOLUTION EPIDURAL; INTRACAUDAL; PERINEURAL at 15:20

## 2018-06-25 RX ADMIN — OXYTOCIN 20 UNITS: 10 INJECTION, SOLUTION INTRAMUSCULAR; INTRAVENOUS at 16:05

## 2018-06-25 RX ADMIN — Medication: at 18:37

## 2018-06-25 RX ADMIN — SODIUM CHLORIDE, POTASSIUM CHLORIDE, SODIUM LACTATE AND CALCIUM CHLORIDE 150 ML/HR: 600; 310; 30; 20 INJECTION, SOLUTION INTRAVENOUS at 15:04

## 2018-06-25 NOTE — ANESTHESIA PROCEDURE NOTES
Spinal Block    Patient location during procedure: OR  Performed By  Anesthesiologist: JORGE LUIS TILLEY  Preanesthetic Checklist  Completed: patient identified, surgical consent, pre-op evaluation, timeout performed, IV checked, risks and benefits discussed and monitors and equipment checked  Spinal Block Prep:  Patient Position:sitting  Sterile Tech:cap, gloves, sterile barriers and mask  Prep:Chloraprep  Patient Monitoring:EKG, continuous pulse oximetry and blood pressure monitoring  Spinal Block Procedure  Approach:midline  Guidance:landmark technique and palpation technique  Location:L3-L4  Needle Type:Pencan  Needle Gauge:25 G  Placement of Spinal needle event:cerebrospinal fluid aspirated  Paresthesia: no  Fluid Appearance:clear  Post Assessment  Patient Tolerance:patient tolerated the procedure well with no apparent complications  Complications no

## 2018-06-25 NOTE — ANESTHESIA PREPROCEDURE EVALUATION
Anesthesia Evaluation     Patient summary reviewed and Nursing notes reviewed   NPO Solid Status: > 6 hours  NPO Liquid Status: > 6 hours           Airway   Mallampati: II  TM distance: >3 FB  Neck ROM: full  Dental      Pulmonary    (+) a smoker Former, asthma (as a child),   Cardiovascular - negative cardio ROS        Neuro/Psych  GI/Hepatic/Renal/Endo      Musculoskeletal (-) negative ROS    Abdominal    Substance History       Comment: Occasional wine when not pregnant   OB/GYN    (+) Pregnant,         Other                        Anesthesia Plan    ASA 2     spinal and ITN     Anesthetic plan and risks discussed with patient.

## 2018-06-25 NOTE — ANESTHESIA POSTPROCEDURE EVALUATION
Patient: Francesca Castillo    Procedure Summary     Date:  18 Room / Location:   [facility]    Anesthesia Start:   Anesthesia Stop:  160    Procedure:   SECTION REPEAT * 39 WKS - SGA / AMA* (N/A Abdomen) Diagnosis:       SGA (small for gestational age)      Previous  delivery affecting pregnancy       labor without delivery      Elderly multigravida in third trimester      GBS bacteriuria      (SGA (small for gestational age) [P05.00])      (Previous  delivery affecting pregnancy [O34.219])      ( labor without delivery [O60.00])      (Elderly multigravida in third trimester [O09.523])      (GBS bacteriuria [R82.71])    Surgeon:  Micah Villagomez MD Provider:  Thomas Zafar DO    Anesthesia Type:  spinal, ITN ASA Status:  2          Anesthesia Type: spinal, ITN  Last vitals  BP   109/60   Temp    97.5   Pulse   112   Resp   16   SpO2    98%     Post Anesthesia Care and Evaluation    Patient location during evaluation: bedside  Patient participation: complete - patient participated  Level of consciousness: awake  Pain score: 0  Pain management: satisfactory to patient  Airway patency: patent  Anesthetic complications: No anesthetic complications  PONV Status: none  Cardiovascular status: acceptable and hemodynamically stable  Respiratory status: acceptable  Hydration status: acceptable

## 2018-06-26 LAB
BASOPHILS # BLD AUTO: 0.01 10*3/MM3 (ref 0–0.2)
BASOPHILS NFR BLD AUTO: 0.1 % (ref 0–1)
DEPRECATED RDW RBC AUTO: 45.4 FL (ref 37–54)
EOSINOPHIL # BLD AUTO: 0.02 10*3/MM3 (ref 0–0.3)
EOSINOPHIL NFR BLD AUTO: 0.2 % (ref 0–3)
ERYTHROCYTE [DISTWIDTH] IN BLOOD BY AUTOMATED COUNT: 13.8 % (ref 11.3–14.5)
HCT VFR BLD AUTO: 34.6 % (ref 34.5–44)
HGB BLD-MCNC: 11.5 G/DL (ref 11.5–15.5)
IMM GRANULOCYTES # BLD: 0.05 10*3/MM3 (ref 0–0.03)
IMM GRANULOCYTES NFR BLD: 0.5 % (ref 0–0.6)
LYMPHOCYTES # BLD AUTO: 1.34 10*3/MM3 (ref 0.6–4.8)
LYMPHOCYTES NFR BLD AUTO: 13.7 % (ref 24–44)
MCH RBC QN AUTO: 30.3 PG (ref 27–31)
MCHC RBC AUTO-ENTMCNC: 33.2 G/DL (ref 32–36)
MCV RBC AUTO: 91.1 FL (ref 80–99)
MONOCYTES # BLD AUTO: 0.7 10*3/MM3 (ref 0–1)
MONOCYTES NFR BLD AUTO: 7.2 % (ref 0–12)
NEUTROPHILS # BLD AUTO: 7.65 10*3/MM3 (ref 1.5–8.3)
NEUTROPHILS NFR BLD AUTO: 78.3 % (ref 41–71)
PLATELET # BLD AUTO: 96 10*3/MM3 (ref 150–450)
PMV BLD AUTO: 12.2 FL (ref 6–12)
RBC # BLD AUTO: 3.8 10*6/MM3 (ref 3.89–5.14)
WBC NRBC COR # BLD: 9.77 10*3/MM3 (ref 3.5–10.8)

## 2018-06-26 PROCEDURE — 85025 COMPLETE CBC W/AUTO DIFF WBC: CPT | Performed by: OBSTETRICS & GYNECOLOGY

## 2018-06-26 RX ADMIN — IBUPROFEN 600 MG: 600 TABLET ORAL at 09:40

## 2018-06-26 RX ADMIN — PRENATAL VIT W/ FE FUMARATE-FA TAB 27-0.8 MG 1 TABLET: 27-0.8 TAB at 09:41

## 2018-06-26 RX ADMIN — DOCUSATE SODIUM 100 MG: 100 CAPSULE, LIQUID FILLED ORAL at 09:40

## 2018-06-26 RX ADMIN — SIMETHICONE CHEW TAB 80 MG 80 MG: 80 TABLET ORAL at 09:40

## 2018-06-26 RX ADMIN — IBUPROFEN 600 MG: 600 TABLET ORAL at 18:13

## 2018-06-26 RX ADMIN — SIMETHICONE CHEW TAB 80 MG 80 MG: 80 TABLET ORAL at 20:57

## 2018-06-26 RX ADMIN — HYDROCODONE BITARTRATE AND ACETAMINOPHEN 1 TABLET: 7.5; 325 TABLET ORAL at 19:52

## 2018-06-26 RX ADMIN — DOCUSATE SODIUM 100 MG: 100 CAPSULE, LIQUID FILLED ORAL at 19:52

## 2018-06-26 RX ADMIN — SIMETHICONE CHEW TAB 80 MG 80 MG: 80 TABLET ORAL at 12:40

## 2018-06-26 RX ADMIN — SIMETHICONE CHEW TAB 80 MG 80 MG: 80 TABLET ORAL at 18:13

## 2018-06-26 NOTE — ANESTHESIA POSTPROCEDURE EVALUATION
Patient: Francesca Castillo    Procedure Summary     Date:  18 Room / Location:   [facility]    Anesthesia Start:   Anesthesia Stop:  160    Procedure:   SECTION REPEAT * 39 WKS - SGA / AMA* (N/A Abdomen) Diagnosis:       SGA (small for gestational age)      Previous  delivery affecting pregnancy       labor without delivery      Elderly multigravida in third trimester      GBS bacteriuria      (SGA (small for gestational age) [P05.00])      (Previous  delivery affecting pregnancy [O34.219])      ( labor without delivery [O60.00])      (Elderly multigravida in third trimester [O09.523])      (GBS bacteriuria [R82.71])    Surgeon:  Micah Villagomez MD Provider:  Thomas Zafar DO    Anesthesia Type:  spinal, ITN ASA Status:  2          Anesthesia Type: spinal, ITN  Last vitals  BP   102/57 (18 0700)   Temp   98 °F (36.7 °C) (18 0700)   Pulse   72 (18 0700)   Resp   16 (18 0700)     SpO2   97 % (18 1700)     Post Anesthesia Care and Evaluation    Patient location during evaluation: bedside  Patient participation: complete - patient participated  Level of consciousness: awake and alert  Pain management: adequate  Airway patency: patent  Anesthetic complications: No anesthetic complications    Cardiovascular status: acceptable  Respiratory status: acceptable  Hydration status: acceptable  Post Neuraxial Block status: Motor and sensory function returned to baseline and No signs or symptoms of PDPH

## 2018-06-27 ENCOUNTER — TELEPHONE (OUTPATIENT)
Dept: OBSTETRICS AND GYNECOLOGY | Facility: CLINIC | Age: 38
End: 2018-06-27

## 2018-06-27 RX ADMIN — HYDROCODONE BITARTRATE AND ACETAMINOPHEN 1 TABLET: 5; 325 TABLET ORAL at 00:19

## 2018-06-27 RX ADMIN — SIMETHICONE CHEW TAB 80 MG 80 MG: 80 TABLET ORAL at 12:01

## 2018-06-27 RX ADMIN — PRENATAL VIT W/ FE FUMARATE-FA TAB 27-0.8 MG 1 TABLET: 27-0.8 TAB at 12:01

## 2018-06-27 RX ADMIN — DOCUSATE SODIUM 100 MG: 100 CAPSULE, LIQUID FILLED ORAL at 12:01

## 2018-06-27 RX ADMIN — IBUPROFEN 600 MG: 600 TABLET ORAL at 00:19

## 2018-06-27 RX ADMIN — SIMETHICONE CHEW TAB 80 MG 80 MG: 80 TABLET ORAL at 18:55

## 2018-06-27 RX ADMIN — DOCUSATE SODIUM 100 MG: 100 CAPSULE, LIQUID FILLED ORAL at 20:45

## 2018-06-27 RX ADMIN — SIMETHICONE CHEW TAB 80 MG 80 MG: 80 TABLET ORAL at 23:54

## 2018-06-27 RX ADMIN — IBUPROFEN 600 MG: 600 TABLET ORAL at 23:54

## 2018-06-27 RX ADMIN — HYDROCODONE BITARTRATE AND ACETAMINOPHEN 1 TABLET: 7.5; 325 TABLET ORAL at 13:27

## 2018-06-27 RX ADMIN — IBUPROFEN 600 MG: 600 TABLET ORAL at 06:26

## 2018-06-27 RX ADMIN — IBUPROFEN 600 MG: 600 TABLET ORAL at 19:02

## 2018-06-27 RX ADMIN — HYDROCODONE BITARTRATE AND ACETAMINOPHEN 1 TABLET: 5; 325 TABLET ORAL at 19:02

## 2018-06-27 RX ADMIN — IBUPROFEN 600 MG: 600 TABLET ORAL at 13:27

## 2018-06-27 RX ADMIN — HYDROCODONE BITARTRATE AND ACETAMINOPHEN 1 TABLET: 5; 325 TABLET ORAL at 23:54

## 2018-06-27 NOTE — TELEPHONE ENCOUNTER
Needs rx Dr. Gray all purpose nipple cream. Called in to [facility] pharmacy.

## 2018-06-28 VITALS
RESPIRATION RATE: 16 BRPM | OXYGEN SATURATION: 97 % | SYSTOLIC BLOOD PRESSURE: 131 MMHG | HEART RATE: 61 BPM | DIASTOLIC BLOOD PRESSURE: 76 MMHG | TEMPERATURE: 97.9 F

## 2018-06-28 PROBLEM — Z98.891 STATUS POST REPEAT LOW TRANSVERSE CESAREAN SECTION: Status: ACTIVE | Noted: 2018-06-28

## 2018-06-28 RX ORDER — IBUPROFEN 600 MG/1
600 TABLET ORAL EVERY 6 HOURS PRN
Qty: 40 TABLET | Refills: 1 | Status: SHIPPED | OUTPATIENT
Start: 2018-06-28 | End: 2018-07-12

## 2018-06-28 RX ORDER — HYDROCODONE BITARTRATE AND ACETAMINOPHEN 5; 325 MG/1; MG/1
1 TABLET ORAL EVERY 6 HOURS PRN
Qty: 20 TABLET | Refills: 0 | Status: SHIPPED | OUTPATIENT
Start: 2018-06-28 | End: 2018-07-05

## 2018-06-28 RX ADMIN — HYDROCODONE BITARTRATE AND ACETAMINOPHEN 1 TABLET: 5; 325 TABLET ORAL at 09:44

## 2018-06-28 RX ADMIN — IBUPROFEN 600 MG: 600 TABLET ORAL at 06:09

## 2018-06-28 RX ADMIN — HYDROCODONE BITARTRATE AND ACETAMINOPHEN 1 TABLET: 5; 325 TABLET ORAL at 06:08

## 2018-06-28 RX ADMIN — PRENATAL VIT W/ FE FUMARATE-FA TAB 27-0.8 MG 1 TABLET: 27-0.8 TAB at 09:44

## 2018-07-02 ENCOUNTER — TELEPHONE (OUTPATIENT)
Dept: OBSTETRICS AND GYNECOLOGY | Facility: CLINIC | Age: 38
End: 2018-07-02

## 2018-07-02 NOTE — TELEPHONE ENCOUNTER
----- Message from Francesca Castillo sent at 7/2/2018  8:44 AM EDT -----  Regarding: RE: FW: FW: FW: FW:Urine culture  Contact: [phone]  Dr Villagomez,    Currently nothing is helping.  I’ve tried benedryl and Zyrtec.  PCP gave me Triamcinolone lotion.  Nothing has had an effect.  Pediatrician said it’s possible that an oral steroid maybe necessary.   goes back to work this week and I need something to promote some improvement -as of now I think it may be getting worse. Have you seen anything like this before and if so any idea how long it lasted?    ----- Message -----  From: Micah Villagomez MD  Sent: 7/2/2018  8:34 AM EDT  To: Francesca Castillo  Subject: RE: FW: FW: FW: FW:Urine culture  Francesca, I would try something like Claritin or Zyrtec once daily as that is an antihistamine and may help with the itching Benadryl may be helpful at night.  Some of these things are relatively contraindicated as they might dry.  Her milk supply temperature itching at Alexa I don't think Much of an option.  Endocrine would certainly help and that could be either Benadryl over-the-counter or hydrocortisone cream as well.  None of those will get absorbed enough into the breast milk to be a significant problem hopefully that helps.  Please let me know if that does not.  Dr. Villagomez    ----- Message -----  From: Francesca Castillo  Sent: 6/30/2018   2:22 PM  To: [facility]  Subject: RE: FW: FW: FW:Urine culture                     Dr. Villagomez,    Since being released on Thursday, I have developed a significant red, itchy rash all over my butt and back of my thighs.  It is extremely itchy and I am unable to sleep.  I was seen by a PCP and they prescribed a topical lotion for it — assuming it is from the mesh underwater.  Is there anything stronger that you can recommend???  Baby is doing well but I am completely unable to get comfortable and am unable to get sleep between the itch and getting up for nightly  feedings.  Help!!!    Thanks,    Francesca  [phone]  ----- Message -----  From: Micah Villagomez MD  Sent: 5/15/2018  4:56 PM EDT  To: Francesca Castilol  Subject: RE: FW: FW: FW:Urine culture  The overall growth is normal especially the head. Let us know if fetal movement decreases - if so we will do NST's. I think Dr Heath has a follow up ultrasound scheduled.    ----- Message -----  From: Francesca Castillo  Sent: 5/15/2018   9:43 AM  To: [facility]  Subject: RE: FW: FW:Urine culture                         Dr. Villagomez,    As a follow up on my appointment and the PDC ultrasound, how concerned souls I be at this point about the abdominal growth lag??  I seem to find myself worrying about it quite a bit.    Thanks,    Francesca  ----- Message -----  From: Micah Villagomez MD  Sent: 3/28/2018 10:26 AM EDT  To: Francesca Castillo  Subject: RE: FW: FW:Urine culture  Francesca, my Gen. is that if you are breathing through the contractions and they are longer than 30 seconds and coming every 10-12 minutes or closer despite fluids and rest I would like to see you in the office if late at night in labor delivery.  If that is the case today call the office and they can work nightly    ----- Message -----  From: Francesca Castillo  Sent: 3/28/2018   8:09 AM  To: [facility]  Subject: RE: FW:Urine culture                             Dr. Villagomez,    At what point do I need to be reseen for the contractions?  The ones I've had previously have been painless; however, last night I had some that were very strong despite being well hydrated and laying on my left side.  I am a little concerned that this is happening way too early (26 weeks).  I didn't have contractions this early with either of my previous pregnancies.  My next appointment is currently scheduled for a week from today (April 4th).  ----- Message -----  From: Micah Villagomez MD  Sent: 3/21/2018  2:20 PM EDT  To: Francesca Castillo  Subject: RE: FW:Urine  culture  The sciatic pain requires changes in position.  Possibly a maternity belt/sling would be helpful.  That pain is most likely assessed with fetal position.  You could try heating pad or Tylenol if it gets worse.  The good news is I don't think you have had any issues with premature labor with ramirez pregnancies.    ----- Message -----  From: Francesca Castillo  Sent: 3/21/2018  11:21 AM  To: [facility]  Subject: RE:Urine culture                                 Thank you - that is great news! I am still having contractions, though not as frequently.  I am also having some pain that seems to be sciatica (lower back almost to my butt) that hurts  when I'm trying to walk or move around.  I've been trying to relax and drink as much water as possible.  Is there anything else I can do - or should I be concerned?  ----- Message -----  From: Micah Villagomez MD  Sent: 3/21/2018 10:14 AM EDT  To: Francesca Castillo  Subject: Urine culture  Francesca, the culture showed only normal vaginal ashleigh.  There is no bladder infection.  Any questions give me a call [phone] thank you Dr. Villagomez

## 2018-07-05 RX ORDER — METHYLPREDNISOLONE 4 MG/1
TABLET ORAL
Qty: 21 EACH | Refills: 0 | Status: SHIPPED | OUTPATIENT
Start: 2018-07-05 | End: 2018-07-12

## 2018-07-12 ENCOUNTER — OFFICE VISIT (OUTPATIENT)
Dept: OBSTETRICS AND GYNECOLOGY | Facility: CLINIC | Age: 38
End: 2018-07-12

## 2018-07-12 VITALS
WEIGHT: 122 LBS | DIASTOLIC BLOOD PRESSURE: 60 MMHG | SYSTOLIC BLOOD PRESSURE: 110 MMHG | BODY MASS INDEX: 21.44 KG/M2 | RESPIRATION RATE: 16 BRPM

## 2018-07-12 DIAGNOSIS — Z09 SURGERY FOLLOW-UP: Primary | ICD-10-CM

## 2018-07-12 PROCEDURE — 0503F POSTPARTUM CARE VISIT: CPT | Performed by: OBSTETRICS & GYNECOLOGY

## 2018-07-12 NOTE — PROGRESS NOTES
Subjective   Chief Complaint   Patient presents with   • Post-op     s/p c/s 18     Francesca Castillo is a 38 y.o. year old  presenting to be seen for her post-operative visit.  She had a .  Currently she reports no problems with eating, bowel movements, voiding, or wound drainage and pain is well controlled.  She may be slightly dehydrated but today has trouble keeping up with the fluid intake with 4 children.  Her son is sleeping through the night well about 3 hours wakes up at 3 AM 6 another 4 hours.    She had an issue with what sounds like contact dermatitis and had seen her primary care.  Pediatrician was concerned about shingles I suppose although this was on her bilateral Botox.  Menorrhagia consider down her legs and even to her calf in the posterior aspect.  She tried many over-the-counter and a wise tell remedies to no success.  Even to sounds like Kenalog lotion which did not help.  Eventually I think I called and a Medrol Dosepak and this helped.  Still a little redness visible.  She reports that it was very red when she got home from the hospital and took a shower looked in the mirror.  Noticed it first the day prior to discharge.  Discussed that I'm not sure if this was due to the mesh underwear they use but then there should've been something anteriorly.  Maybe it was a Chux pads since is on her bottom and thighs not sure of any other thing that would be exposed just to that part of her body.  Good news is she feels much better now    The following portions of the patient's history were reviewed and updated as appropriate:problem list, current medications and allergies    Review of Systems normal bladder and bowels     Objective   /60   Resp 16   Wt 55.3 kg (122 lb)   LMP 2017 (Exact Date) Comment: 28 cycle days  Breastfeeding? Yes   BMI 21.44 kg/m²     General:  well developed; well nourished  no acute distress  appears stated age   Abdomen: soft, non-tender; no  masses  no umbilical or inginual hernias are present  no hepato-splenomegaly  incision is healing, clean, dry and intact   Pelvis: Not performed.          Assessment   1. Doing well 2 weeks post operative from repeat  section  2. History of contact dermatitis which is resolving or essentially resolved that required Medrol Dosepak     Plan   1. Increase activity so she is back to normal in 1-2 weeks.  2. Continue hydration MiraLAX as needed stay on prenatal vitamins with iron  3.  4 weeks for post-partum checkup      Medications Rx this encounter:  No orders of the defined types were placed in this encounter.         This note was electronically signed.    Micah Villagomez MD  2018

## 2018-08-09 ENCOUNTER — POSTPARTUM VISIT (OUTPATIENT)
Dept: OBSTETRICS AND GYNECOLOGY | Facility: CLINIC | Age: 38
End: 2018-08-09

## 2018-08-09 VITALS
WEIGHT: 121 LBS | RESPIRATION RATE: 16 BRPM | DIASTOLIC BLOOD PRESSURE: 60 MMHG | BODY MASS INDEX: 21.27 KG/M2 | SYSTOLIC BLOOD PRESSURE: 110 MMHG

## 2018-08-09 PROCEDURE — 0503F POSTPARTUM CARE VISIT: CPT | Performed by: OBSTETRICS & GYNECOLOGY

## 2018-08-09 NOTE — PROGRESS NOTES
Subjective   Chief Complaint   Patient presents with   • Postpartum Care     6 wks s/p c/s     Francesca Castillo is a 38 y.o. year old  presenting to be seen for her postpartum visit.  She had a  ([facility]).     Since delivery she has not been sexually active.  She does not have concerns about post-partum blues/depression.  She is both breast and bottle feeding due to decreased milk supply.  For ongoing contraception, she is considering vasectomy for birth control.      The following portions of the patient's history were reviewed and updated as appropriate:problem list, current medications and allergies    Review of Systems   normal bladder and bowels     Objective   /60   Resp 16   Wt 54.9 kg (121 lb)   LMP 2017 (Exact Date) Comment: 28 cycle days  Breastfeeding? Yes   BMI 21.27 kg/m²     General:  well developed; well nourished  no acute distress  appears stated age   Breasts:  Not performed.   Abdomen: soft, non-tender; no masses  no umbilical or inginual hernias are present  no hepato-splenomegaly  incision is healing, clean, dry and intact   Pelvis: Clinical staff was present for exam  External genitalia:  normal appearance of the external genitalia including Bartholin's and [place]'s glands.  :  urethral meatus normal;  Vaginal:  normal pink mucosa without prolapse or lesions.  Uterus:  normal size, shape and consistency. anteverted;  Adnexa:  normal bimanual exam of the adnexa.  Rectal:  digital rectal exam not performed; anus visually normal appearing.          Assessment   1. Normal post op/postpartum recovery doing well back to normal activities  2. Both breast and bottle feeding     Plan   1. She may return to normal activity without restriction including intercourse  2. Continue prenatal vitamins with iron while nursing  3. Annual examination in November or sooner as needed    Medications Rx this encounter:  No orders of the defined types were placed in this encounter.          This note was electronically signed.    Micah Villagomez MD  August 9, 2018

## 2018-11-20 ENCOUNTER — OFFICE VISIT (OUTPATIENT)
Dept: OBSTETRICS AND GYNECOLOGY | Facility: CLINIC | Age: 38
End: 2018-11-20

## 2018-11-20 VITALS
BODY MASS INDEX: 21.79 KG/M2 | RESPIRATION RATE: 16 BRPM | SYSTOLIC BLOOD PRESSURE: 116 MMHG | DIASTOLIC BLOOD PRESSURE: 70 MMHG | WEIGHT: 124 LBS

## 2018-11-20 DIAGNOSIS — Z01.419 WOMEN'S ANNUAL ROUTINE GYNECOLOGICAL EXAMINATION: Primary | ICD-10-CM

## 2018-11-20 DIAGNOSIS — N91.2 AMENORRHEA: ICD-10-CM

## 2018-11-20 PROBLEM — O60.00 PREMATURE LABOR: Status: RESOLVED | Noted: 2018-04-08 | Resolved: 2018-11-20

## 2018-11-20 PROBLEM — O34.219 PREVIOUS CESAREAN DELIVERY AFFECTING PREGNANCY: Status: RESOLVED | Noted: 2017-11-16 | Resolved: 2018-11-20

## 2018-11-20 PROBLEM — R82.71 GBS BACTERIURIA: Status: RESOLVED | Noted: 2018-03-09 | Resolved: 2018-11-20

## 2018-11-20 PROBLEM — Z3A.39 39 WEEKS GESTATION OF PREGNANCY: Status: RESOLVED | Noted: 2017-11-16 | Resolved: 2018-11-20

## 2018-11-20 PROBLEM — O34.211 ENCOUNTER FOR MATERNAL CARE FOR LOW TRANSVERSE SCAR FROM REPEAT CESAREAN DELIVERY: Status: RESOLVED | Noted: 2018-06-25 | Resolved: 2018-11-20

## 2018-11-20 PROBLEM — O09.523 ELDERLY MULTIGRAVIDA IN THIRD TRIMESTER: Status: RESOLVED | Noted: 2017-12-08 | Resolved: 2018-11-20

## 2018-11-20 PROCEDURE — 99395 PREV VISIT EST AGE 18-39: CPT | Performed by: OBSTETRICS & GYNECOLOGY

## 2018-11-20 RX ORDER — MEDROXYPROGESTERONE ACETATE 5 MG/1
5 TABLET ORAL DAILY
Qty: 10 TABLET | Refills: 3 | Status: SHIPPED | OUTPATIENT
Start: 2018-11-20 | End: 2018-11-30

## 2018-11-20 RX ORDER — PRENATAL VIT/IRON FUM/FOLIC AC 27MG-0.8MG
1 TABLET ORAL DAILY
Qty: 90 TABLET | Refills: 3 | Status: SHIPPED | OUTPATIENT
Start: 2018-11-20 | End: 2019-11-26

## 2018-11-20 NOTE — PROGRESS NOTES
Subjective   Chief Complaint   Patient presents with   • Annual Exam     Francesca Castillo is a 38 y.o. year old  presenting to be seen for her annual exam.    Current birth control method: vasectomy.  She's not had a period since delivery.  She nurses about once a morning and once in the evening but does not produce very much milk.  Her son is also getting supplemented during the day and after breast-feeding.  It is working for now so she is going to continue.  Discussed maybe placing on progesterone every 60 days to have a withdrawal bleeding episode she can time the such that she doesn't expect to bleed over Chris holidays are on vacation etc.    No LMP recorded.    She is sexually active.   Condoms are not typically used.      Past 6 month menstrual history:    Cycle Frequency: absent   Menstrual cycle character: flow has been absent                     She exercises regularly: no.active  Calcium intake: is} adequate 2- 3 milk per day..  She performs self breast exam:no.  She has concerns about domestic violence: no.    The following portions of the patient's history were reviewed and updated as appropriate:problem list, current medications, allergies, past family history, past medical history, past social history and past surgical history.  Mom diagnosed with non- hodgkins lymphoma also Lupus 30 years; s/p stroke and mild MI now 63 yo     Review of Systems    normal bladder and bowels  Objective   /70   Resp 16   Wt 56.2 kg (124 lb)   Breastfeeding? Yes   BMI 21.79 kg/m²      General:  well developed; well nourished  no acute distress  appears stated age   Skin:  No suspicious lesions seen   Thyroid:    Breasts:  Not performed.   Abdomen: soft, non-tender; no masses  no umbilical or inginual hernias are present  no hepato-splenomegaly  incision is healed   Pelvis: Clinical staff was present for exam  External genitalia:  normal appearance of the external genitalia including Bartholin's and  [place]'s glands.  :  urethral meatus normal;  Vaginal:  normal pink mucosa without prolapse or lesions.  Uterus:  normal size, shape and consistency. Somewhat anterior but retroflexed  Adnexa:  normal bimanual exam of the adnexa.  Rectal:  digital rectal exam not performed; anus visually normal appearing.     Lab Review   Pap test January 2017    Imaging  No data reviewed       Assessment   1. Normal examination with exception of:  2. Secondary amenorrhea possibly due to breast-feeding.  Discussed we should get her to have a progesterone withdrawal bleed about every 60 days or so.  3. Patient still nursing intermittently twice daily  4. Mother diagnosed with non-Hodgkin's lymphoma.  Uncle has another unknown cancer.  Not sure if familial association.  Mother had lupus for 30 years and on various medications.     Plan   1. 1000 mg calcium in divided doses ideally in diet; regular exercise  2. Self breast awareness and mammogram protocols discussed.  3. Annual examination or sooner as needed; Pap testing when she returns in about 1 year  4. Progesterone 10 days every 60 days no menses      Medications Rx this encounter:  New Medications Ordered This Visit   Medications   • Prenatal Vit-Fe Fumarate-FA (PRENATAL VITAMIN 27-0.8) 27-0.8 MG tablet tablet     Sig: Take 1 tablet by mouth Daily.     Dispense:  90 tablet     Refill:  3   • medroxyPROGESTERone (PROVERA) 5 MG tablet     Sig: Take 1 tablet by mouth Daily for 10 doses.     Dispense:  10 tablet     Refill:  3          This note was electronically signed.    Micah Villagomez MD  11/20/2018

## 2019-11-26 ENCOUNTER — OFFICE VISIT (OUTPATIENT)
Dept: OBSTETRICS AND GYNECOLOGY | Facility: CLINIC | Age: 39
End: 2019-11-26

## 2019-11-26 VITALS
SYSTOLIC BLOOD PRESSURE: 112 MMHG | WEIGHT: 112 LBS | HEIGHT: 64 IN | BODY MASS INDEX: 19.12 KG/M2 | DIASTOLIC BLOOD PRESSURE: 60 MMHG

## 2019-11-26 DIAGNOSIS — Z01.419 ENCOUNTER FOR WELL WOMAN EXAM WITH ROUTINE GYNECOLOGICAL EXAM: Primary | ICD-10-CM

## 2019-11-26 PROCEDURE — 99395 PREV VISIT EST AGE 18-39: CPT | Performed by: OBSTETRICS & GYNECOLOGY

## 2019-11-26 RX ORDER — IBUPROFEN 200 MG
200 TABLET ORAL EVERY 6 HOURS PRN
COMMUNITY
End: 2020-12-01

## 2019-11-26 NOTE — PROGRESS NOTES
Subjective   Chief Complaint   Patient presents with   • Annual Exam     Francesca Castillo is a 39 y.o. year old  presenting to be seen for her annual exam.    Current birth control method: Vasectomy.  She is here today for annual examination.  Unfortunately her youngest son is sick and getting a cold rather fussy.    Patient's last menstrual period was 2019.    She is sexually active.   Condoms are not typically used.    Lares is painful or she is having problems :no  She has concerns about domestic violence: no.    Cycle Frequency: regular, predictable and consistent every 28 - 32 days   Menstrual cycle character: flow is typically moderately heavy   Cycle Duration: 5 - 7   Number of heavy days of flows: 3 - heavier than in the past changing raul often   Intermenstrual bleeding present: no   Post-coital bleeding present: no     She exercises regularly: no.  Self breast awareness:no    Calcium intake: is not adequate.3  Caffeine intake: no or mild caffeine use  Social History    Tobacco Use      Smoking status: Former Smoker        Types: Cigarettes        Quit date: 2004        Years since quitting: 15.6      Smokeless tobacco: Never Used    Social History     Substance and Sexual Activity   Alcohol Use No    Comment: Wine- occasional use when not pregnant        The following portions of the patient's history were reviewed and updated as is  appropriate:She  has a past medical history of Asthma, Frequent headaches, and History of abnormal cervical Pap smear.  She does not have any pertinent problems on file.  She  has a past surgical history that includes  section (); [place] tooth extraction; Breast surgery;  section (); and  section (N/A, 2018).  Her family history includes Heart disease in her paternal grandfather; Lymphoma in her mother.  She  reports that she quit smoking about 15 years ago. Her smoking use included cigarettes. She has never used  "smokeless tobacco. She reports that she does not drink alcohol or use drugs.  She is allergic to amoxicillin..    Current Outpatient Medications:   •  ibuprofen (ADVIL,MOTRIN) 200 MG tablet, Take 200 mg by mouth Every 6 (Six) Hours As Needed for Mild Pain ., Disp: , Rfl:     Review of systems  Constitutional    POS nothing reported                            NEG anorexia, chills or night sweats  Breast                POS nothing reported                            NEG persistent breast lump, skin dimpling, breast tenderness or nipple discharge  GI                      POS nothing reported                            NEG bloating, change in bowel habits, melena or reflux symptoms                       POS nothing reported                            NEG dysuria, frequency or hematuria       Objective   /60   Ht 161.3 cm (63.5\")   Wt 50.8 kg (112 lb)   LMP 11/06/2019   Breastfeeding? No   BMI 19.53 kg/m²       General:  well developed; well nourished  no acute distress  appears stated age   Skin:  No suspicious lesions seen   Thyroid:    Breasts:  Examined in supine position  Symmetric without masses or skin dimpling  Nipples normal without inversion, lesions or discharge  Bilateral implants are noted without obvious palpable abnormalities   Abdomen: soft, non-tender; no masses  no umbilical or inguinal hernias are present  no hepato-splenomegaly   Pelvis: Clinical staff was present for exam  External genitalia:  normal appearance of the external genitalia including Bartholin's and [place]'s glands.  :  urethral meatus normal;  Vaginal:  normal pink mucosa without prolapse or lesions.  Cervix:  normal appearance. Pap obtained  Uterus:  normal size, shape and consistency.  Adnexa:  normal bimanual exam of the adnexa.       Lab Review   CBC and UA    Imaging  No data reviewed       Assessment     1. Normal GYN examination with apparent ovulation with cervical mucus noted  2. Pap co-testing done today       "       Plan     1. Annual examination or sooner as needed  2. 1000 mg calcium in divided doses ideally in diet; regular exercise  3. Self breast awareness if > 30 years of age; could start mammograms age 40-45         No orders of the defined types were placed in this encounter.    No orders of the defined types were placed in this encounter.          This note was electronically signed.    Micah Villagomez MD  11/26/2019

## 2020-12-01 ENCOUNTER — OFFICE VISIT (OUTPATIENT)
Dept: OBSTETRICS AND GYNECOLOGY | Facility: CLINIC | Age: 40
End: 2020-12-01

## 2020-12-01 ENCOUNTER — LAB REQUISITION (OUTPATIENT)
Dept: LAB | Facility: HOSPITAL | Age: 40
End: 2020-12-01

## 2020-12-01 VITALS
SYSTOLIC BLOOD PRESSURE: 110 MMHG | BODY MASS INDEX: 18.78 KG/M2 | DIASTOLIC BLOOD PRESSURE: 60 MMHG | WEIGHT: 110 LBS | HEIGHT: 64 IN

## 2020-12-01 DIAGNOSIS — Z00.00 ROUTINE GENERAL MEDICAL EXAMINATION AT A HEALTH CARE FACILITY: ICD-10-CM

## 2020-12-01 DIAGNOSIS — Z01.419 ENCOUNTER FOR GYNECOLOGICAL EXAMINATION WITHOUT ABNORMAL FINDING: Primary | ICD-10-CM

## 2020-12-01 PROBLEM — Z98.891 STATUS POST REPEAT LOW TRANSVERSE CESAREAN SECTION: Status: RESOLVED | Noted: 2018-06-28 | Resolved: 2020-12-01

## 2020-12-01 PROBLEM — Z98.82 H/O BREAST AUGMENTATION: Status: RESOLVED | Noted: 2017-11-19 | Resolved: 2020-12-01

## 2020-12-01 PROBLEM — O34.219 VAGINAL BIRTH AFTER CESAREAN: Status: RESOLVED | Noted: 2017-11-19 | Resolved: 2020-12-01

## 2020-12-01 PROBLEM — Z20.828 EXPOSURE TO INFLUENZA: Status: RESOLVED | Noted: 2018-02-05 | Resolved: 2020-12-01

## 2020-12-01 PROCEDURE — 99395 PREV VISIT EST AGE 18-39: CPT | Performed by: OBSTETRICS & GYNECOLOGY

## 2020-12-01 PROCEDURE — 36415 COLL VENOUS BLD VENIPUNCTURE: CPT

## 2020-12-01 RX ORDER — DIPHENHYDRAMINE HCL 25 MG
25 CAPSULE ORAL EVERY 6 HOURS PRN
COMMUNITY

## 2020-12-01 RX ORDER — FLUTICASONE PROPIONATE 50 MCG
2 SPRAY, SUSPENSION (ML) NASAL DAILY PRN
COMMUNITY

## 2020-12-01 NOTE — PROGRESS NOTES
Subjective   Chief Complaint   Patient presents with   • Annual Exam     Francesca Castillo is a 40 y.o. year old  presenting to be seen for her annual exam.    Current birth control method: Vasectomy.  She has been doing fairly well during the pandemic with exception of some increased anxiety with kids home not being able to visit to work see her parents etc.    Patient's last menstrual period was 2020.    She is sexually active.   Condoms are not typically used.    STDs and sexual behavior discussed.  Tetherow is painful or she is having problems :no  She has concerns about domestic violence: no.    Cycle Frequency: regular, predictable and consistent every 28 - 32 days   Menstrual cycle character: flow is typically normal   Cycle Duration: 3 - 5   Number of heavy days of flows: 1   Intermenstrual bleeding present: no   Post-coital bleeding present: no     She exercises regularly: yes.  Discussed maintaining healthy weight and nutrition and injury avoidance  Self breast awareness:yes    Calcium intake: is adequate.3  Caffeine intake: no or mild caffeine use  Social History    Tobacco Use      Smoking status: Former Smoker        Types: Cigarettes        Quit date: 2004        Years since quittin.6      Smokeless tobacco: Never Used    Social History     Substance and Sexual Activity   Alcohol Use Yes   • Alcohol/week: 1.0 standard drinks   • Types: 1 Glasses of wine per week   • Frequency: 2-4 times a month      Discussed avoidance of illicit drugs    The following portions of the patient's history were reviewed and updated as is  appropriate:problem list, current medications, allergies, past family history, past medical history, past social history and past surgical history.    Current Outpatient Medications:   •  diphenhydrAMINE (BENADRYL) 25 mg capsule, Take 25 mg by mouth Every 6 (Six) Hours As Needed for Itching., Disp: , Rfl:   •  fluticasone (FLONASE) 50 MCG/ACT nasal spray, 2 sprays  "into the nostril(s) as directed by provider Daily As Needed., Disp: , Rfl:     Discussed risk factors for hypertension, hyperlipidemia coronary heart disease.    Review of systems    Constitutional    POS nothing reported                            NEG anorexia, fatigue, malaise or night sweats  Breast                POS nothing reported                            NEG persistent breast lump, skin dimpling or nipple discharge  GI                      POS nothing reported                            NEG bloating, change in bowel habits, melena or reflux symptoms                       POS nothing reported                            NEG dysuria or hematuria         Objective   /60   Ht 161.3 cm (63.5\")   Wt 49.9 kg (110 lb)   LMP 11/24/2020 Comment: husb vasc  Breastfeeding No   BMI 19.18 kg/m²       General:  well developed; well nourished  no acute distress  appears stated age   Skin:  No suspicious lesions seen   Thyroid:    Breasts:  Examined in supine position  Symmetric without masses or skin dimpling  Nipples normal without inversion, lesions or discharge  Bilateral implants are noted without obvious palpable abnormalities   Abdomen: soft, non-tender; no masses  no umbilical or inguinal hernias are present  no hepato-splenomegaly   Pelvis: Clinical staff was present for exam  External genitalia:  normal appearance of the external genitalia including Bartholin's and [place]'s glands.  :  urethral meatus normal;  Vaginal:  normal pink mucosa without prolapse or lesions.  Uterus:  normal size, shape and consistency.  Adnexa:  normal bimanual exam of the adnexa.  Rectal:  digital rectal exam not performed; anus visually normal appearing.       Lab Review   Pap test    Imaging  No data reviewed               Assessment     1. Normal GYN examination  2. Discussed mammogram protocol she would like one at age 41  3. She will be 41 in the spring consider lipid screening TSH hepatitis C screening       "       Plan     1. Annual examination or sooner as needed  2. 1000 mg calcium in divided doses ideally in diet; regular exercise  3. Self breast awareness and mammogram protocols discussed  4.    Lab work            No orders of the defined types were placed in this encounter.    Orders Placed This Encounter   Procedures   • Hepatitis C Antibody   • TSH   • Lipid Panel           This note was electronically signed.    Micah Villagomez MD  12/1/2020

## 2020-12-02 LAB
CHOLEST SERPL-MCNC: 149 MG/DL (ref 0–200)
HCV AB S/CO SERPL IA: <0.1 S/CO RATIO (ref 0–0.9)
HDLC SERPL-MCNC: 80 MG/DL (ref 40–60)
LDLC SERPL CALC-MCNC: 58 MG/DL (ref 0–100)
TRIGL SERPL-MCNC: 50 MG/DL (ref 0–150)
TSH SERPL DL<=0.005 MIU/L-ACNC: 1.27 UIU/ML (ref 0.27–4.2)
VLDLC SERPL CALC-MCNC: 11 MG/DL (ref 5–40)

## 2021-12-02 ENCOUNTER — OFFICE VISIT (OUTPATIENT)
Dept: OBSTETRICS AND GYNECOLOGY | Facility: CLINIC | Age: 41
End: 2021-12-02

## 2021-12-02 VITALS
DIASTOLIC BLOOD PRESSURE: 60 MMHG | WEIGHT: 115 LBS | BODY MASS INDEX: 20.05 KG/M2 | RESPIRATION RATE: 16 BRPM | SYSTOLIC BLOOD PRESSURE: 112 MMHG

## 2021-12-02 DIAGNOSIS — Z01.411 ENCOUNTER FOR GYNECOLOGICAL EXAMINATION WITH ABNORMAL FINDING: Primary | ICD-10-CM

## 2021-12-02 DIAGNOSIS — B37.9 CANDIDIASIS: ICD-10-CM

## 2021-12-02 DIAGNOSIS — Z12.31 ENCOUNTER FOR SCREENING MAMMOGRAM FOR MALIGNANT NEOPLASM OF BREAST: ICD-10-CM

## 2021-12-02 PROCEDURE — 87210 SMEAR WET MOUNT SALINE/INK: CPT | Performed by: NURSE PRACTITIONER

## 2021-12-02 PROCEDURE — 99396 PREV VISIT EST AGE 40-64: CPT | Performed by: NURSE PRACTITIONER

## 2021-12-02 RX ORDER — FLUCONAZOLE 150 MG/1
TABLET ORAL
Qty: 3 TABLET | Refills: 0 | Status: SHIPPED | OUTPATIENT
Start: 2021-12-02 | End: 2022-12-06

## 2021-12-02 NOTE — PROGRESS NOTES
Annual Visit     Patient Name: Francesca Castillo  : 1980   MRN: [id number]   Care Team: Patient Care Team:  Saundra Daniels MD as PCP - General (Family Medicine)    Chief Complaint:    Chief Complaint   Patient presents with   • Annual Exam   • Dysmenorrhea       HPI: Francesca Castillo is a 41 y.o. year old  presenting to be seen for her gynecologic exam.   Vasectomy     LMP 21   Has noticed periods have been a little heavier the last few months   Flow x 6 days with 2 days heaviest flow - saturates a tampon q 1-2 hrs   No BTB   LLQ pain 2 months ago - states it was pretty uncomfortable for several days   It is better now, but still occasional   Some constipation issues   Has also been very stressed lately   Works full time, has 4 kids, and her parents who are not in good health are moving here this month   No dyspareunia or postcoital bldg  She had labs with PCP in may including CBC and TSH and WNL per pt     Pap smear  WNL and HPV negative     Hasn't had a mammogram yet     Reports increased vaginal d/c x 2 months now   No itch, odor, or burn     Subjective      /60   Resp 16   Wt 52.2 kg (115 lb)   LMP 2021 Comment: husb vasc  Breastfeeding No   BMI 20.05 kg/m²     BMI reviewed: Body mass index is 20.05 kg/m².      Objective     Physical Exam    Neuro: alert and oriented to person, place and time   General:  alert; cooperative; well developed; well nourished   Skin:  No suspicious lesions seen   Thyroid: normal to inspection and palpation   Lungs:  breathing is unlabored  clear to auscultation bilaterally   Heart:  regular rate and rhythm, S1, S2 normal, no murmur, click, rub or gallop  normal apical impulse   Breasts:  Examined in supine position  Symmetric without masses or skin dimpling  Nipples normal without inversion, lesions or discharge  There are no palpable axillary nodes  Bilateral implants are noted without obvious palpable abnormalities   Abdomen: soft,  non-tender; no masses  no umbilical or inguinal hernias are present  no hepato-splenomegaly   Pelvis: Clinical staff was present for exam  External genitalia:  normal appearance of the external genitalia including Bartholin's and [place]'s glands.  :  urethral meatus normal;  Vaginal:  normal pink mucosa without prolapse or lesions. discharge present -  white and thick; wet prep done: pseudo-hyphae are present;  Cervix:  normal appearance.  Uterus:  normal size, shape and consistency.  Adnexa:  normal bimanual exam of the adnexa.  Rectal:  digital rectal exam not performed; anus visually normal appearing.         Assessment / Plan      Assessment  Problems Addressed This Visit    ICD-10-CM ICD-9-CM   1. Encounter for gynecological examination with abnormal finding  Z01.411 V72.31   2. Candidiasis  B37.9 112.9   3. Encounter for screening mammogram for malignant neoplasm of breast  Z12.31 V76.12       Plan    Pap smear not indicated   Discussed monthly SBEs   Mammogram ordered     Wet mount = yeast   Discussed txment and prevention   Diflucan to pharmacy - if sx do not resolve, she will call     Pelvic exam WNL today   If LLQ pain does not resolve completely or if period changes occur, she will let me know     AV 1 yr             Follow Up  Return in about 1 year (around 12/2/2022) for Annual physical.  Patient was given instructions and counseling regarding her condition or for health maintenance advice. Please see specific information pulled into the AVS if appropriate.     Gin Carlos, JES  December 2, 2021  10:11 EST

## 2022-01-18 ENCOUNTER — HOSPITAL ENCOUNTER (OUTPATIENT)
Dept: MAMMOGRAPHY | Facility: HOSPITAL | Age: 42
Discharge: HOME | End: 2022-01-18
Admitting: NURSE PRACTITIONER

## 2022-01-18 PROCEDURE — 77063 BREAST TOMOSYNTHESIS BI: CPT

## 2022-01-18 PROCEDURE — 77063 BREAST TOMOSYNTHESIS BI: CPT | Performed by: RADIOLOGY

## 2022-01-18 PROCEDURE — 77067 SCR MAMMO BI INCL CAD: CPT | Performed by: RADIOLOGY

## 2022-01-18 PROCEDURE — 77067 SCR MAMMO BI INCL CAD: CPT

## 2022-02-10 ENCOUNTER — HOSPITAL ENCOUNTER (OUTPATIENT)
Dept: ULTRASOUND IMAGING | Facility: HOSPITAL | Age: 42
Discharge: HOME | End: 2022-02-10

## 2022-02-10 ENCOUNTER — HOSPITAL ENCOUNTER (OUTPATIENT)
Dept: MAMMOGRAPHY | Facility: HOSPITAL | Age: 42
Discharge: HOME | End: 2022-02-10

## 2022-02-10 DIAGNOSIS — R92.8 ABNORMAL MAMMOGRAM: ICD-10-CM

## 2022-02-10 PROCEDURE — 76642 ULTRASOUND BREAST LIMITED: CPT | Performed by: RADIOLOGY

## 2022-02-10 PROCEDURE — 77065 DX MAMMO INCL CAD UNI: CPT | Performed by: RADIOLOGY

## 2022-02-10 PROCEDURE — 76642 ULTRASOUND BREAST LIMITED: CPT

## 2022-02-10 PROCEDURE — 77065 DX MAMMO INCL CAD UNI: CPT

## 2022-02-10 PROCEDURE — G0279 TOMOSYNTHESIS, MAMMO: HCPCS

## 2022-02-10 PROCEDURE — 77061 BREAST TOMOSYNTHESIS UNI: CPT | Performed by: RADIOLOGY

## 2022-05-13 ENCOUNTER — TRANSCRIBE ORDERS (OUTPATIENT)
Dept: ADMINISTRATIVE | Facility: HOSPITAL | Age: 42
End: 2022-05-13

## 2022-05-13 ENCOUNTER — HOSPITAL ENCOUNTER (OUTPATIENT)
Dept: GENERAL RADIOLOGY | Facility: HOSPITAL | Age: 42
Discharge: HOME | End: 2022-05-13
Admitting: FAMILY MEDICINE

## 2022-05-13 DIAGNOSIS — M95.2 SKULL DEFORMITY: ICD-10-CM

## 2022-05-13 DIAGNOSIS — M95.2 SKULL DEFORMITY: Primary | ICD-10-CM

## 2022-05-13 PROCEDURE — 70250 X-RAY EXAM OF SKULL: CPT

## 2022-10-21 ENCOUNTER — HOSPITAL ENCOUNTER (OUTPATIENT)
Dept: MAMMOGRAPHY | Facility: HOSPITAL | Age: 42
Discharge: HOME | End: 2022-10-21

## 2022-10-21 ENCOUNTER — HOSPITAL ENCOUNTER (OUTPATIENT)
Dept: ULTRASOUND IMAGING | Facility: HOSPITAL | Age: 42
Discharge: HOME | End: 2022-10-21

## 2022-10-21 DIAGNOSIS — R92.8 ABNORMAL MAMMOGRAM: ICD-10-CM

## 2022-10-21 PROCEDURE — 77065 DX MAMMO INCL CAD UNI: CPT

## 2022-10-21 PROCEDURE — 77061 BREAST TOMOSYNTHESIS UNI: CPT | Performed by: RADIOLOGY

## 2022-10-21 PROCEDURE — 77065 DX MAMMO INCL CAD UNI: CPT | Performed by: RADIOLOGY

## 2022-10-21 PROCEDURE — 76642 ULTRASOUND BREAST LIMITED: CPT | Performed by: RADIOLOGY

## 2022-10-21 PROCEDURE — 76642 ULTRASOUND BREAST LIMITED: CPT

## 2022-10-21 PROCEDURE — G0279 TOMOSYNTHESIS, MAMMO: HCPCS

## 2022-12-06 ENCOUNTER — OFFICE VISIT (OUTPATIENT)
Dept: OBSTETRICS AND GYNECOLOGY | Facility: CLINIC | Age: 42
End: 2022-12-06

## 2022-12-06 VITALS
DIASTOLIC BLOOD PRESSURE: 70 MMHG | BODY MASS INDEX: 20.23 KG/M2 | RESPIRATION RATE: 16 BRPM | WEIGHT: 116 LBS | SYSTOLIC BLOOD PRESSURE: 114 MMHG

## 2022-12-06 DIAGNOSIS — Z01.419 ENCOUNTER FOR GYNECOLOGICAL EXAMINATION WITHOUT ABNORMAL FINDING: Primary | ICD-10-CM

## 2022-12-06 DIAGNOSIS — Z12.4 PAP SMEAR FOR CERVICAL CANCER SCREENING: ICD-10-CM

## 2022-12-06 PROCEDURE — 99396 PREV VISIT EST AGE 40-64: CPT | Performed by: NURSE PRACTITIONER

## 2022-12-06 RX ORDER — AMITRIPTYLINE HYDROCHLORIDE 10 MG/1
10-20 TABLET, FILM COATED ORAL
COMMUNITY
Start: 2022-11-15

## 2022-12-06 NOTE — PROGRESS NOTES
Annual Visit     Patient Name: Francesca Castillo  : 1980   MRN: [id number]   Care Team: Patient Care Team:  Saundra Daniels MD as PCP - General (Family Medicine)  Gin Carlos APRN as Nurse Practitioner (Nurse Practitioner)    Chief Complaint:    Chief Complaint   Patient presents with   • Annual Exam       HPI: Francesca Castillo is a 42 y.o. year old  presenting to be seen for her gynecologic exam.   Vasectomy      LMP 22  Periods monthly x 7 days   2-3 days of heaviest flow - saturates a tampon q 2-3 hrs   No BTB   Some months flow is heavier than others   LLQ pain has significantly improved - still present but infrequent and not overly bothersome now   Constipation is rare      Pap smear 2019 WNL and HPV negative      Mammogram 2022 birads 0   Right breast dx mammogram birads 3   F/u done 10/2022 right breast dx mammogram birads 2 - return to routine screening due 2023     She works part time and has 4 kids   Her elderly parents moved 7 minutes away - it has been good to have them close        Subjective      I have reviewed the patients family history, social history, past medical history, past surgical history and have updated it as appropriate.    /70   Resp 16   Wt 52.6 kg (116 lb)   LMP 2022   BMI 20.23 kg/m²     BMI reviewed: Body mass index is 20.23 kg/m².      Objective     Physical Exam    Neuro: alert and oriented to person, place and time   General:  alert; cooperative; well developed; well nourished   Skin:  No suspicious lesions seen   Thyroid: normal to inspection and palpation   Lungs:  breathing is unlabored  clear to auscultation bilaterally   Heart:  regular rate and rhythm, S1, S2 normal, no murmur, click, rub or gallop  normal apical impulse   Breasts:  Examined in supine position  Symmetric without masses or skin dimpling  Nipples normal without inversion, lesions or discharge  There are no palpable axillary nodes  Bilateral implants are  noted without obvious palpable abnormalities   Abdomen: soft, non-tender; no masses  no umbilical or inguinal hernias are present  no hepato-splenomegaly   Pelvis: Clinical staff was present for exam  External genitalia:  normal appearance of the external genitalia including Bartholin's and [place]'s glands.  :  urethral meatus normal;  Vaginal:  normal pink mucosa without prolapse or lesions. blood present -  small amount;  Cervix:  normal appearance. blood is seen coming from external cervical os;  Uterus:  normal size, shape and consistency.  Adnexa:  normal bimanual exam of the adnexa.  Rectal:  digital rectal exam not performed; anus visually normal appearing.         Assessment / Plan      Assessment  Problems Addressed This Visit    ICD-10-CM ICD-9-CM   1. Encounter for gynecological examination without abnormal finding  Z01.419 V72.31   2. Pap smear for cervical cancer screening  Z12.4 V76.2       Plan    Pap smear pending   Discussed monthly SBEs and importance of annual imaging   Mammogram due Jan 2023     She will cont to keep a bldg calendar and if AUB develops, let me know for further evaluation - pt v/u      Pelvic exam WNL today   If LLQ pain does not resolve completely or worsens, she will let me know      AV 1 yr         40 to 64: Counseling/Anticipatory Guidance Discussed: screenings and self-breast exam    Follow Up  Return in about 1 year (around 12/6/2023) for Annual physical.  Patient was given instructions and counseling regarding her condition or for health maintenance advice. Please see specific information pulled into the AVS if appropriate.     Gin Carlos, JES  December 6, 2022  10:15 EST

## 2022-12-07 LAB — REF LAB TEST METHOD: NORMAL

## 2025-04-15 ENCOUNTER — TRANSCRIBE ORDERS (OUTPATIENT)
Dept: ADMINISTRATIVE | Facility: HOSPITAL | Age: 45
End: 2025-04-15
Payer: COMMERCIAL

## 2025-04-15 DIAGNOSIS — Z12.31 VISIT FOR SCREENING MAMMOGRAM: Primary | ICD-10-CM

## 2025-04-29 LAB
NCCN CRITERIA FLAG: NORMAL
TYRER CUZICK SCORE: 13.3

## 2025-04-30 ENCOUNTER — HOSPITAL ENCOUNTER (OUTPATIENT)
Dept: MAMMOGRAPHY | Facility: HOSPITAL | Age: 45
Discharge: HOME | End: 2025-04-30
Admitting: FAMILY MEDICINE
Payer: COMMERCIAL

## 2025-04-30 DIAGNOSIS — Z12.31 VISIT FOR SCREENING MAMMOGRAM: ICD-10-CM

## 2025-04-30 PROCEDURE — 77063 BREAST TOMOSYNTHESIS BI: CPT

## 2025-04-30 PROCEDURE — 77067 SCR MAMMO BI INCL CAD: CPT

## (undated) DEVICE — SOL IRR H2O BTL 1000ML STRL

## (undated) DEVICE — PK C/SECT 10

## (undated) DEVICE — SOL IRR NACL 0.9PCT BT 1000ML

## (undated) DEVICE — PROXIMATE RH ROTATING HEAD SKIN STAPLERS (35 WIDE) CONTAINS 35 STAINLESS STEEL STAPLES: Brand: PROXIMATE

## (undated) DEVICE — SUT GUT CHRM 1 CTX 36IN 905H

## (undated) DEVICE — TRY SPINE BLCK WHITACRE 25G 3X5IN

## (undated) DEVICE — GLV SURG BIOGEL LTX PF 7 1/2

## (undated) DEVICE — MAT PREVALON MOBL TRANSFR AIR WO/PAD 39X80IN